# Patient Record
Sex: FEMALE | Race: BLACK OR AFRICAN AMERICAN | HISPANIC OR LATINO | Employment: PART TIME | URBAN - METROPOLITAN AREA
[De-identification: names, ages, dates, MRNs, and addresses within clinical notes are randomized per-mention and may not be internally consistent; named-entity substitution may affect disease eponyms.]

---

## 2018-03-12 ENCOUNTER — CONVERSION ENCOUNTER (OUTPATIENT)
Dept: MAMMOGRAPHY | Facility: CLINIC | Age: 43
End: 2018-03-12

## 2018-03-29 LAB
ALBUMIN SERPL BCP-MCNC: 4.4 G/DL (ref 3–5.2)
ALP SERPL-CCNC: 53 U/L (ref 43–122)
ALT SERPL W P-5'-P-CCNC: 21 U/L (ref 9–52)
ANION GAP SERPL CALCULATED.3IONS-SCNC: 9 MMOL/L (ref 5–14)
AST SERPL W P-5'-P-CCNC: 19 U/L (ref 14–36)
BILIRUB SERPL-MCNC: 0.5 MG/DL
BUN SERPL-MCNC: 10 MG/DL (ref 5–25)
CALCIUM SERPL-MCNC: 9.4 MG/DL (ref 8.4–10.2)
CHLORIDE SERPL-SCNC: 104 MEQ/L (ref 97–108)
CO2 SERPL-SCNC: 28 MMOL/L (ref 22–30)
CREATINE, SERUM (HISTORICAL): 0.57 MG/DL (ref 0.6–1.2)
EGFR (HISTORICAL): >60 ML/MIN/1.73 M2
GLUCOSE SERPL-MCNC: 71 MG/DL (ref 70–99)
POTASSIUM SERPL-SCNC: 4.7 MEQ/L (ref 3.6–5)
SODIUM SERPL-SCNC: 140 MEQ/L (ref 137–147)
T4 FREE SERPL-MCNC: 0.76 NG/DL (ref 0.78–2.19)
TOTAL PROTEIN (HISTORICAL): 7.8 G/DL (ref 5.9–8.4)
TSH SERPL DL<=0.05 MIU/L-ACNC: 9.51 UIU/ML (ref 0.47–4.68)

## 2018-10-12 ENCOUNTER — TELEPHONE (OUTPATIENT)
Dept: OBGYN CLINIC | Facility: CLINIC | Age: 43
End: 2018-10-12

## 2019-01-28 ENCOUNTER — HOSPITAL ENCOUNTER (OUTPATIENT)
Dept: RADIOLOGY | Facility: HOSPITAL | Age: 44
Discharge: HOME | End: 2019-01-28
Payer: COMMERCIAL

## 2019-01-28 ENCOUNTER — OFFICE VISIT (OUTPATIENT)
Dept: FAMILY MEDICINE CLINIC | Facility: CLINIC | Age: 44
End: 2019-01-28

## 2019-01-28 ENCOUNTER — APPOINTMENT (OUTPATIENT)
Dept: LAB | Facility: HOSPITAL | Age: 44
End: 2019-01-28
Payer: COMMERCIAL

## 2019-01-28 ENCOUNTER — TRANSCRIBE ORDERS (OUTPATIENT)
Dept: ADMINISTRATIVE | Facility: HOSPITAL | Age: 44
End: 2019-01-28

## 2019-01-28 VITALS
RESPIRATION RATE: 16 BRPM | WEIGHT: 172 LBS | SYSTOLIC BLOOD PRESSURE: 112 MMHG | TEMPERATURE: 97.6 F | DIASTOLIC BLOOD PRESSURE: 70 MMHG | HEART RATE: 85 BPM | OXYGEN SATURATION: 99 % | HEIGHT: 71 IN | BODY MASS INDEX: 24.08 KG/M2

## 2019-01-28 DIAGNOSIS — R10.9 RIGHT SIDED ABDOMINAL PAIN: ICD-10-CM

## 2019-01-28 DIAGNOSIS — D64.9 ANEMIA, UNSPECIFIED TYPE: ICD-10-CM

## 2019-01-28 DIAGNOSIS — E03.9 HYPOTHYROIDISM, UNSPECIFIED TYPE: ICD-10-CM

## 2019-01-28 DIAGNOSIS — D64.9 ANEMIA, UNSPECIFIED TYPE: Primary | ICD-10-CM

## 2019-01-28 DIAGNOSIS — E03.9 HYPOTHYROIDISM, UNSPECIFIED TYPE: Primary | ICD-10-CM

## 2019-01-28 LAB
ALBUMIN SERPL BCP-MCNC: 4.3 G/DL (ref 3–5.2)
ALP SERPL-CCNC: 56 U/L (ref 43–122)
ALT SERPL W P-5'-P-CCNC: 17 U/L (ref 9–52)
ANION GAP SERPL CALCULATED.3IONS-SCNC: 6 MMOL/L (ref 5–14)
ANISOCYTOSIS BLD QL SMEAR: PRESENT
AST SERPL W P-5'-P-CCNC: 17 U/L (ref 14–36)
BASOPHILS # BLD AUTO: 0.03 THOUSAND/UL (ref 0–0.1)
BASOPHILS NFR MAR MANUAL: 1 % (ref 0–1)
BILIRUB SERPL-MCNC: 0.6 MG/DL
BUN SERPL-MCNC: 10 MG/DL (ref 5–25)
CALCIUM SERPL-MCNC: 9.5 MG/DL (ref 8.4–10.2)
CHLORIDE SERPL-SCNC: 105 MMOL/L (ref 97–108)
CO2 SERPL-SCNC: 28 MMOL/L (ref 22–30)
CREAT SERPL-MCNC: 0.64 MG/DL (ref 0.6–1.2)
EOSINOPHIL # BLD AUTO: 0.17 THOUSAND/UL (ref 0–0.4)
EOSINOPHIL NFR BLD MANUAL: 5 % (ref 0–6)
ERYTHROCYTE [DISTWIDTH] IN BLOOD BY AUTOMATED COUNT: 18.9 %
FERRITIN SERPL-MCNC: 3 NG/ML (ref 8–388)
GFR SERPL CREATININE-BSD FRML MDRD: 108 ML/MIN/1.73SQ M
GLUCOSE P FAST SERPL-MCNC: 84 MG/DL (ref 70–99)
HCT VFR BLD AUTO: 30 % (ref 36–46)
HGB BLD-MCNC: 8.9 G/DL (ref 12–16)
HYPERCHROMIA BLD QL SMEAR: PRESENT
IRON SATN MFR SERPL: 5 %
IRON SERPL-MCNC: 18 UG/DL (ref 50–170)
LYMPHOCYTES # BLD AUTO: 1.16 THOUSAND/UL (ref 0.5–4)
LYMPHOCYTES # BLD AUTO: 35 % (ref 25–45)
MCH RBC QN AUTO: 19.2 PG (ref 26–34)
MCHC RBC AUTO-ENTMCNC: 29.6 G/DL (ref 31–36)
MCV RBC AUTO: 65 FL (ref 80–100)
MONOCYTES # BLD AUTO: 0.23 THOUSAND/UL (ref 0.2–0.9)
MONOCYTES NFR BLD AUTO: 7 % (ref 1–10)
NEUTS SEG # BLD: 1.72 THOUSAND/UL (ref 1.8–7.8)
NEUTS SEG NFR BLD AUTO: 52 %
PLATELET # BLD AUTO: 326 THOUSANDS/UL (ref 150–450)
PLATELET BLD QL SMEAR: ADEQUATE
PMV BLD AUTO: 9 FL (ref 8.9–12.7)
POIKILOCYTOSIS BLD QL SMEAR: PRESENT
POTASSIUM SERPL-SCNC: 4.7 MMOL/L (ref 3.6–5)
PROT SERPL-MCNC: 7.9 G/DL (ref 5.9–8.4)
RBC # BLD AUTO: 4.62 MILLION/UL (ref 4–5.2)
RBC MORPH BLD: ABNORMAL
SODIUM SERPL-SCNC: 139 MMOL/L (ref 137–147)
T4 FREE SERPL-MCNC: 0.83 NG/DL (ref 0.76–1.46)
TARGETS BLD QL SMEAR: PRESENT
TIBC SERPL-MCNC: 368 UG/DL (ref 250–450)
TOTAL CELLS COUNTED SPEC: 100
TSH SERPL DL<=0.05 MIU/L-ACNC: 6.66 UIU/ML (ref 0.47–4.68)
WBC # BLD AUTO: 3.3 THOUSAND/UL (ref 4.5–11)

## 2019-01-28 PROCEDURE — 99213 OFFICE O/P EST LOW 20 MIN: CPT | Performed by: FAMILY MEDICINE

## 2019-01-28 PROCEDURE — 83550 IRON BINDING TEST: CPT

## 2019-01-28 PROCEDURE — 71101 X-RAY EXAM UNILAT RIBS/CHEST: CPT

## 2019-01-28 PROCEDURE — 83540 ASSAY OF IRON: CPT

## 2019-01-28 PROCEDURE — 80053 COMPREHEN METABOLIC PANEL: CPT

## 2019-01-28 PROCEDURE — 36415 COLL VENOUS BLD VENIPUNCTURE: CPT

## 2019-01-28 PROCEDURE — 84443 ASSAY THYROID STIM HORMONE: CPT

## 2019-01-28 PROCEDURE — 82728 ASSAY OF FERRITIN: CPT

## 2019-01-28 PROCEDURE — 85007 BL SMEAR W/DIFF WBC COUNT: CPT

## 2019-01-28 PROCEDURE — 84439 ASSAY OF FREE THYROXINE: CPT

## 2019-01-28 PROCEDURE — 85027 COMPLETE CBC AUTOMATED: CPT

## 2019-01-28 RX ORDER — LEVOTHYROXINE SODIUM 0.03 MG/1
TABLET ORAL
COMMUNITY
Start: 2018-11-20 | End: 2019-05-03

## 2019-01-28 NOTE — ASSESSMENT & PLAN NOTE
Had an ultrasound of the liver previously which ruled out gallbladder stones  She has point tenderness over lower ribs on the right side    Will obtain x-ray of ribs to rule out any fracture or slipped rib syndrome  Will obtain CMP and CBC as well

## 2019-01-28 NOTE — PROGRESS NOTES
Assessment/Plan:    Hypothyroidism  She has been of Synthroid for about a month  Recheck TSH and refill based on results    Right sided abdominal pain  Had an ultrasound of the liver previously which ruled out gallbladder stones  She has point tenderness over lower ribs on the right side  Will obtain x-ray of ribs to rule out any fracture or slipped rib syndrome  Will obtain CMP and CBC as well       Diagnoses and all orders for this visit:    Hypothyroidism, unspecified type  -     TSH, 3rd generation with Free T4 reflex; Future    Right sided abdominal pain  -     CBC and differential; Future  -     Comprehensive metabolic panel; Future  -     XR ribs right w pa chest min 3 views; Future    Other orders  -     levothyroxine 25 mcg tablet;           Subjective:      Patient ID: Oneal Fernandez is a 37 y o  female  HPI  Patient is here for follow-up  She has not been seen in our office since March 2018  Still reporting right upper quadrant pain and related to food  Happened about 4 times a week  She rated as 7/10  No associated nausea or vomiting or fever  Did not take any medications for it  Patient had this pain for over a year      The following portions of the patient's history were reviewed and updated as appropriate: allergies, current medications, past family history, past medical history, past social history, past surgical history and problem list     Review of Systems   Constitutional: Negative for chills, fatigue and fever  HENT: Negative for sinus pressure and sore throat  Eyes: Negative for photophobia, pain and visual disturbance  Respiratory: Negative for cough, chest tightness and shortness of breath  Cardiovascular: Negative for chest pain and leg swelling  Gastrointestinal: Positive for abdominal pain  Negative for diarrhea  Genitourinary: Negative for difficulty urinating, dysuria, frequency, hematuria and urgency     Musculoskeletal: Negative for arthralgias and myalgias  Skin: Negative for rash  Neurological: Negative for dizziness, seizures, syncope and headaches  Hematological: Negative for adenopathy  Objective:      /70 (BP Location: Right arm, Patient Position: Sitting, Cuff Size: Standard)   Pulse 85   Temp 97 6 °F (36 4 °C) (Temporal)   Resp 16   Ht 5' 11" (1 803 m)   Wt 78 kg (172 lb)   SpO2 99%   BMI 23 99 kg/m²          Physical Exam   Constitutional: She is oriented to person, place, and time  She appears well-developed and well-nourished  HENT:   Head: Normocephalic and atraumatic  Mouth/Throat: No oropharyngeal exudate  Eyes: Pupils are equal, round, and reactive to light  Conjunctivae and EOM are normal  Right eye exhibits no discharge  Left eye exhibits no discharge  Neck: Normal range of motion  Neck supple  No JVD present  No thyromegaly present  Cardiovascular: Normal rate, regular rhythm and normal heart sounds  No murmur heard  Pulmonary/Chest: Effort normal and breath sounds normal  No respiratory distress  She has no wheezes  She has no rales  Abdominal: Bowel sounds are normal  She exhibits no distension  There is no tenderness  Right-sided tenderness with palpation over lower ribs   Musculoskeletal: Normal range of motion  She exhibits no edema or deformity  Neurological: She is alert and oriented to person, place, and time  Skin: Skin is warm and dry  Vitals reviewed

## 2019-01-29 ENCOUNTER — TELEPHONE (OUTPATIENT)
Dept: FAMILY MEDICINE CLINIC | Facility: CLINIC | Age: 44
End: 2019-01-29

## 2019-01-29 NOTE — TELEPHONE ENCOUNTER
This patient had blood work done yesterday that I need to discuss with her in person  She is [place] speaking and it's a lot for staff to explain over the phone and I will need more work up  Please call her and schedule her with me this Thursday at 1 pm or 1:40 It's OK with me to double book  Thanks

## 2019-01-30 NOTE — TELEPHONE ENCOUNTER
That's fine, let me know what time she confirms and I will schedule   I won't schedule without confirming then have her no show

## 2019-01-31 ENCOUNTER — TELEPHONE (OUTPATIENT)
Dept: FAMILY MEDICINE CLINIC | Facility: CLINIC | Age: 44
End: 2019-01-31

## 2019-01-31 DIAGNOSIS — E03.9 HYPOTHYROIDISM, UNSPECIFIED TYPE: Primary | ICD-10-CM

## 2019-01-31 RX ORDER — LEVOTHYROXINE SODIUM 0.03 MG/1
25 TABLET ORAL DAILY
Qty: 30 TABLET | Refills: 2 | Status: SHIPPED | OUTPATIENT
Start: 2019-01-31 | End: 2019-05-03

## 2019-01-31 NOTE — TELEPHONE ENCOUNTER
I tried calling patient again no answer left message  I then try calling the emergency contact wrong number

## 2019-02-11 ENCOUNTER — TELEPHONE (OUTPATIENT)
Dept: PEDIATRICS CLINIC | Facility: CLINIC | Age: 44
End: 2019-02-11

## 2019-02-11 NOTE — TELEPHONE ENCOUNTER
Please call pt and ask her if she can come in today  She needs to be seen  If she can not today you can put her with me Wednesday or Thursday   I am in the office

## 2019-05-02 ENCOUNTER — OFFICE VISIT (OUTPATIENT)
Dept: FAMILY MEDICINE CLINIC | Facility: CLINIC | Age: 44
End: 2019-05-02

## 2019-05-02 ENCOUNTER — APPOINTMENT (OUTPATIENT)
Dept: LAB | Facility: CLINIC | Age: 44
End: 2019-05-02
Payer: COMMERCIAL

## 2019-05-02 VITALS
DIASTOLIC BLOOD PRESSURE: 64 MMHG | OXYGEN SATURATION: 99 % | TEMPERATURE: 98 F | SYSTOLIC BLOOD PRESSURE: 100 MMHG | RESPIRATION RATE: 16 BRPM | HEIGHT: 71 IN | BODY MASS INDEX: 24.36 KG/M2 | HEART RATE: 85 BPM | WEIGHT: 174 LBS

## 2019-05-02 DIAGNOSIS — N92.0 MENORRHAGIA WITH REGULAR CYCLE: ICD-10-CM

## 2019-05-02 DIAGNOSIS — D64.9 ANEMIA, UNSPECIFIED TYPE: ICD-10-CM

## 2019-05-02 DIAGNOSIS — D64.9 ANEMIA, UNSPECIFIED TYPE: Primary | ICD-10-CM

## 2019-05-02 LAB
ALBUMIN SERPL BCP-MCNC: 3.9 G/DL (ref 3.5–5)
ALP SERPL-CCNC: 53 U/L (ref 46–116)
ALT SERPL W P-5'-P-CCNC: 14 U/L (ref 12–78)
ANION GAP SERPL CALCULATED.3IONS-SCNC: 4 MMOL/L (ref 4–13)
AST SERPL W P-5'-P-CCNC: 6 U/L (ref 5–45)
BASOPHILS # BLD AUTO: 0.04 THOUSANDS/ΜL (ref 0–0.1)
BASOPHILS NFR BLD AUTO: 1 % (ref 0–1)
BILIRUB SERPL-MCNC: 0.53 MG/DL (ref 0.2–1)
BUN SERPL-MCNC: 7 MG/DL (ref 5–25)
CALCIUM SERPL-MCNC: 8.6 MG/DL (ref 8.3–10.1)
CHLORIDE SERPL-SCNC: 108 MMOL/L (ref 100–108)
CO2 SERPL-SCNC: 25 MMOL/L (ref 21–32)
CREAT SERPL-MCNC: 0.75 MG/DL (ref 0.6–1.3)
EOSINOPHIL # BLD AUTO: 0.06 THOUSAND/ΜL (ref 0–0.61)
EOSINOPHIL NFR BLD AUTO: 2 % (ref 0–6)
ERYTHROCYTE [DISTWIDTH] IN BLOOD BY AUTOMATED COUNT: 18.8 % (ref 11.6–15.1)
FERRITIN SERPL-MCNC: 3 NG/ML (ref 8–388)
GFR SERPL CREATININE-BSD FRML MDRD: 97 ML/MIN/1.73SQ M
GLUCOSE P FAST SERPL-MCNC: 75 MG/DL (ref 65–99)
HCT VFR BLD AUTO: 27.9 % (ref 34.8–46.1)
HGB BLD-MCNC: 7.7 G/DL (ref 11.5–15.4)
IMM GRANULOCYTES # BLD AUTO: 0.01 THOUSAND/UL (ref 0–0.2)
IMM GRANULOCYTES NFR BLD AUTO: 0 % (ref 0–2)
IRON SATN MFR SERPL: 6 %
IRON SERPL-MCNC: 23 UG/DL (ref 50–170)
LYMPHOCYTES # BLD AUTO: 1.4 THOUSANDS/ΜL (ref 0.6–4.47)
LYMPHOCYTES NFR BLD AUTO: 41 % (ref 14–44)
MCH RBC QN AUTO: 18.8 PG (ref 26.8–34.3)
MCHC RBC AUTO-ENTMCNC: 27.6 G/DL (ref 31.4–37.4)
MCV RBC AUTO: 68 FL (ref 82–98)
MONOCYTES # BLD AUTO: 0.51 THOUSAND/ΜL (ref 0.17–1.22)
MONOCYTES NFR BLD AUTO: 15 % (ref 4–12)
NEUTROPHILS # BLD AUTO: 1.38 THOUSANDS/ΜL (ref 1.85–7.62)
NEUTS SEG NFR BLD AUTO: 41 % (ref 43–75)
NRBC BLD AUTO-RTO: 0 /100 WBCS
PLATELET # BLD AUTO: 261 THOUSANDS/UL (ref 149–390)
PMV BLD AUTO: 12 FL (ref 8.9–12.7)
POTASSIUM SERPL-SCNC: 4 MMOL/L (ref 3.5–5.3)
PROT SERPL-MCNC: 7.8 G/DL (ref 6.4–8.2)
RBC # BLD AUTO: 4.09 MILLION/UL (ref 3.81–5.12)
SODIUM SERPL-SCNC: 137 MMOL/L (ref 136–145)
T4 FREE SERPL-MCNC: 0.72 NG/DL (ref 0.76–1.46)
TIBC SERPL-MCNC: 378 UG/DL (ref 250–450)
TSH SERPL DL<=0.05 MIU/L-ACNC: 15 UIU/ML (ref 0.36–3.74)
VIT B12 SERPL-MCNC: 1141 PG/ML (ref 100–900)
WBC # BLD AUTO: 3.4 THOUSAND/UL (ref 4.31–10.16)

## 2019-05-02 PROCEDURE — 83550 IRON BINDING TEST: CPT

## 2019-05-02 PROCEDURE — 83540 ASSAY OF IRON: CPT

## 2019-05-02 PROCEDURE — 36415 COLL VENOUS BLD VENIPUNCTURE: CPT

## 2019-05-02 PROCEDURE — 82728 ASSAY OF FERRITIN: CPT

## 2019-05-02 PROCEDURE — 84439 ASSAY OF FREE THYROXINE: CPT

## 2019-05-02 PROCEDURE — 85025 COMPLETE CBC W/AUTO DIFF WBC: CPT

## 2019-05-02 PROCEDURE — 82607 VITAMIN B-12: CPT

## 2019-05-02 PROCEDURE — 84443 ASSAY THYROID STIM HORMONE: CPT

## 2019-05-02 PROCEDURE — 80053 COMPREHEN METABOLIC PANEL: CPT

## 2019-05-02 PROCEDURE — 99213 OFFICE O/P EST LOW 20 MIN: CPT | Performed by: FAMILY MEDICINE

## 2019-05-02 PROCEDURE — 83020 HEMOGLOBIN ELECTROPHORESIS: CPT

## 2019-05-02 RX ORDER — ASCORBIC ACID 500 MG
500 TABLET ORAL DAILY
Qty: 30 TABLET | Refills: 2 | Status: SHIPPED | OUTPATIENT
Start: 2019-05-02

## 2019-05-02 RX ORDER — FERROUS SULFATE TAB EC 324 MG (65 MG FE EQUIVALENT) 324 (65 FE) MG
324 TABLET DELAYED RESPONSE ORAL
Qty: 30 TABLET | Refills: 1 | Status: SHIPPED | OUTPATIENT
Start: 2019-05-02 | End: 2020-11-25 | Stop reason: ALTCHOICE

## 2019-05-03 ENCOUNTER — TELEPHONE (OUTPATIENT)
Dept: FAMILY MEDICINE CLINIC | Facility: CLINIC | Age: 44
End: 2019-05-03

## 2019-05-03 DIAGNOSIS — D64.9 ANEMIA, UNSPECIFIED TYPE: ICD-10-CM

## 2019-05-03 DIAGNOSIS — E03.9 HYPOTHYROIDISM, UNSPECIFIED TYPE: Primary | ICD-10-CM

## 2019-05-03 RX ORDER — LEVOTHYROXINE SODIUM 0.05 MG/1
50 TABLET ORAL
Qty: 30 TABLET | Refills: 5 | Status: SHIPPED | OUTPATIENT
Start: 2019-05-03 | End: 2019-07-09 | Stop reason: DRUGHIGH

## 2019-05-06 LAB
DEPRECATED HGB OTHER BLD-IMP: 0 %
HGB A MFR BLD: 4.2 % (ref 1.8–3.2)
HGB A MFR BLD: 95.1 % (ref 96.4–98.8)
HGB C MFR BLD: 0 %
HGB F MFR BLD: 0.7 % (ref 0–2)
HGB FRACT BLD-IMP: ABNORMAL
HGB S BLD QL SOLY: NEGATIVE
HGB S MFR BLD: 0 %

## 2019-07-08 ENCOUNTER — APPOINTMENT (OUTPATIENT)
Dept: LAB | Facility: CLINIC | Age: 44
End: 2019-07-08
Payer: COMMERCIAL

## 2019-07-08 ENCOUNTER — ANNUAL EXAM (OUTPATIENT)
Dept: OBGYN CLINIC | Facility: CLINIC | Age: 44
End: 2019-07-08

## 2019-07-08 ENCOUNTER — TRANSCRIBE ORDERS (OUTPATIENT)
Dept: LAB | Facility: CLINIC | Age: 44
End: 2019-07-08

## 2019-07-08 VITALS
BODY MASS INDEX: 24.72 KG/M2 | HEIGHT: 71 IN | DIASTOLIC BLOOD PRESSURE: 70 MMHG | WEIGHT: 176.6 LBS | SYSTOLIC BLOOD PRESSURE: 100 MMHG

## 2019-07-08 DIAGNOSIS — R10.2 PELVIC PAIN: ICD-10-CM

## 2019-07-08 DIAGNOSIS — Z01.411 ENCOUNTER FOR GYNECOLOGICAL EXAMINATION WITH ABNORMAL FINDING: Primary | ICD-10-CM

## 2019-07-08 DIAGNOSIS — Z12.4 SCREENING FOR CERVICAL CANCER: ICD-10-CM

## 2019-07-08 DIAGNOSIS — Z12.39 SCREENING FOR BREAST CANCER: ICD-10-CM

## 2019-07-08 DIAGNOSIS — E03.9 HYPOTHYROIDISM, UNSPECIFIED TYPE: ICD-10-CM

## 2019-07-08 DIAGNOSIS — D64.9 ANEMIA, UNSPECIFIED TYPE: ICD-10-CM

## 2019-07-08 DIAGNOSIS — N92.1 MENORRHAGIA WITH IRREGULAR CYCLE: ICD-10-CM

## 2019-07-08 LAB
BASOPHILS # BLD AUTO: 0.03 THOUSANDS/ΜL (ref 0–0.1)
BASOPHILS NFR BLD AUTO: 1 % (ref 0–1)
EOSINOPHIL # BLD AUTO: 0.07 THOUSAND/ΜL (ref 0–0.61)
EOSINOPHIL NFR BLD AUTO: 2 % (ref 0–6)
ERYTHROCYTE [DISTWIDTH] IN BLOOD BY AUTOMATED COUNT: 18.3 % (ref 11.6–15.1)
HCT VFR BLD AUTO: 30.6 % (ref 34.8–46.1)
HGB BLD-MCNC: 8.6 G/DL (ref 11.5–15.4)
IMM GRANULOCYTES # BLD AUTO: 0.01 THOUSAND/UL (ref 0–0.2)
IMM GRANULOCYTES NFR BLD AUTO: 0 % (ref 0–2)
LYMPHOCYTES # BLD AUTO: 1.39 THOUSANDS/ΜL (ref 0.6–4.47)
LYMPHOCYTES NFR BLD AUTO: 44 % (ref 14–44)
MCH RBC QN AUTO: 19.8 PG (ref 26.8–34.3)
MCHC RBC AUTO-ENTMCNC: 28.1 G/DL (ref 31.4–37.4)
MCV RBC AUTO: 70 FL (ref 82–98)
MONOCYTES # BLD AUTO: 0.47 THOUSAND/ΜL (ref 0.17–1.22)
MONOCYTES NFR BLD AUTO: 15 % (ref 4–12)
NEUTROPHILS # BLD AUTO: 1.21 THOUSANDS/ΜL (ref 1.85–7.62)
NEUTS SEG NFR BLD AUTO: 38 % (ref 43–75)
NRBC BLD AUTO-RTO: 0 /100 WBCS
PLATELET # BLD AUTO: 325 THOUSANDS/UL (ref 149–390)
PMV BLD AUTO: 11.2 FL (ref 8.9–12.7)
RBC # BLD AUTO: 4.35 MILLION/UL (ref 3.81–5.12)
T4 FREE SERPL-MCNC: 0.83 NG/DL (ref 0.76–1.46)
TSH SERPL DL<=0.05 MIU/L-ACNC: 9.25 UIU/ML (ref 0.36–3.74)
WBC # BLD AUTO: 3.18 THOUSAND/UL (ref 4.31–10.16)

## 2019-07-08 PROCEDURE — 84443 ASSAY THYROID STIM HORMONE: CPT

## 2019-07-08 PROCEDURE — 3725F SCREEN DEPRESSION PERFORMED: CPT | Performed by: NURSE PRACTITIONER

## 2019-07-08 PROCEDURE — 87624 HPV HI-RISK TYP POOLED RSLT: CPT | Performed by: NURSE PRACTITIONER

## 2019-07-08 PROCEDURE — 84439 ASSAY OF FREE THYROXINE: CPT

## 2019-07-08 PROCEDURE — 99386 PREV VISIT NEW AGE 40-64: CPT | Performed by: NURSE PRACTITIONER

## 2019-07-08 PROCEDURE — G0145 SCR C/V CYTO,THINLAYER,RESCR: HCPCS | Performed by: NURSE PRACTITIONER

## 2019-07-08 PROCEDURE — 85025 COMPLETE CBC W/AUTO DIFF WBC: CPT

## 2019-07-08 PROCEDURE — 36415 COLL VENOUS BLD VENIPUNCTURE: CPT

## 2019-07-08 NOTE — LETTER
2019    To Sukhjinder Diaz  : 1975      This letter is to advise you that your recent PAP SMEAR results were reviewed by me and are NORMAL    We will see you in 1 year for your annual exam     ZEN Jaquez

## 2019-07-08 NOTE — PROGRESS NOTES
Jb Castillo is a 37 y o  female who presents today for annual GYN exam   Her last pap smear was performed 3/8/2018 and result was NILM  She reports no history of abnormal pap smears in her past  Her last mammogram was performed 3/12/18 and result was WNL  She reports menses as regular but frequency shortened to every 20 days and duration 5 days with heavy flow and c/o constant pelvic pain over past 3 months  Patient's last menstrual period was 2019 (approximate)  Her contraceptive method is nothing - she is not currently sexually active  Her general medical history has been reviewed and she reports it as follows:    Past Medical History:   Diagnosis Date    Fibroid 2017    small fibroids noted    Hypothyroid      Past Surgical History:   Procedure Laterality Date     SECTION  ,      OB History        5    Para   2    Term   2       0    AB   3    Living   2       SAB   2    TAB   1    Ectopic   0    Multiple   0    Live Births   2               Social History     Tobacco Use    Smoking status: Never Smoker    Smokeless tobacco: Never Used   Substance Use Topics    Alcohol use: Yes     Frequency: Monthly or less     Drinks per session: 1 or 2    Drug use: Never     Cancer-related family history is negative for Breast cancer and Cancer  Current Outpatient Medications:     ascorbic acid (VITAMIN C) 500 mg tablet, Take 1 tablet (500 mg total) by mouth daily, Disp: 30 tablet, Rfl: 2    ferrous sulfate 324 (65 Fe) mg, Take 1 tablet (324 mg total) by mouth 2 (two) times a day before meals, Disp: 30 tablet, Rfl: 1    levothyroxine 50 mcg tablet, Take 1 tablet (50 mcg total) by mouth daily in the early morning, Disp: 30 tablet, Rfl: 5    Review of Systems:  Review of Systems   Constitutional: Negative  Gastrointestinal: Negative  Genitourinary: Positive for menstrual problem and pelvic pain   Negative for difficulty urinating and vaginal discharge  Skin: Negative  Physical Exam:  /70   Ht 5' 11" (1 803 m)   Wt 80 1 kg (176 lb 9 6 oz)   LMP 06/26/2019 (Approximate)   BMI 24 63 kg/m²   Physical Exam   Constitutional: She is oriented to person, place, and time  She appears well-developed and well-nourished  Genitourinary: Vagina normal and uterus normal  There is no lesion on the right labia  There is no lesion on the left labia  Vagina exhibits no lesion and no rugosity  No vaginal discharge found  Right adnexum displays tenderness  Right adnexum does not display mass  Left adnexum displays tenderness  Left adnexum does not display mass  Cervix does not exhibit motion tenderness, lesion or pinkness  Uterus is not tender  Neck: Neck supple  No thyromegaly present  Cardiovascular: Normal rate and regular rhythm  Pulmonary/Chest: Effort normal and breath sounds normal  Right breast exhibits no mass, no nipple discharge, no skin change and no tenderness  Left breast exhibits no mass, no nipple discharge, no skin change and no tenderness  Abdominal: Soft  There is tenderness  Neurological: She is alert and oriented to person, place, and time  Skin: Skin is warm and dry  Assessment/Plan:   1  Abnormal well-woman GYN exam   2  Cervical cancer screening:  Pap smear done with HPV co-testing  3  STD screening:  Patient declines  4  Breast cancer screening:  Normal breast exam   Order placed for bilateral screening mammogram    5  Depression Screening: Patient's depression screening was positive with a PHQ-2 score of 0  No intervention is indicated  6  BMI Counseling: Body mass index is 24 63 kg/m²  No intervention is indicated  7  Pelvic pain:  Order placed for pelvic ultrasound  8  Abnormal Uterine Bleeding:  Order placed for pelvic ultrasound  9  Return to office in 2 weeks to review ultrasound results

## 2019-07-09 DIAGNOSIS — E03.9 HYPOTHYROIDISM, UNSPECIFIED TYPE: Primary | ICD-10-CM

## 2019-07-09 LAB
HPV HR 12 DNA CVX QL NAA+PROBE: NEGATIVE
HPV16 DNA CVX QL NAA+PROBE: NEGATIVE
HPV18 DNA CVX QL NAA+PROBE: NEGATIVE

## 2019-07-09 RX ORDER — LEVOTHYROXINE SODIUM 0.07 MG/1
75 TABLET ORAL DAILY
Qty: 30 TABLET | Refills: 1 | Status: SHIPPED | OUTPATIENT
Start: 2019-07-09 | End: 2019-12-04

## 2019-07-11 LAB
LAB AP GYN PRIMARY INTERPRETATION: NORMAL
Lab: NORMAL

## 2019-07-17 ENCOUNTER — HOSPITAL ENCOUNTER (OUTPATIENT)
Dept: ULTRASOUND IMAGING | Facility: HOSPITAL | Age: 44
Discharge: HOME | End: 2019-07-17
Attending: NURSE PRACTITIONER
Payer: COMMERCIAL

## 2019-07-17 ENCOUNTER — HOSPITAL ENCOUNTER (OUTPATIENT)
Dept: MAMMOGRAPHY | Facility: CLINIC | Age: 44
Discharge: HOME | End: 2019-07-17
Attending: NURSE PRACTITIONER
Payer: COMMERCIAL

## 2019-07-17 VITALS — WEIGHT: 176 LBS | HEIGHT: 71 IN | BODY MASS INDEX: 24.64 KG/M2

## 2019-07-17 DIAGNOSIS — Z12.39 SCREENING FOR BREAST CANCER: ICD-10-CM

## 2019-07-17 DIAGNOSIS — R10.2 PELVIC PAIN: ICD-10-CM

## 2019-07-17 PROCEDURE — 76830 TRANSVAGINAL US NON-OB: CPT

## 2019-07-17 PROCEDURE — 76856 US EXAM PELVIC COMPLETE: CPT

## 2019-07-17 PROCEDURE — 77067 SCR MAMMO BI INCL CAD: CPT

## 2019-08-30 ENCOUNTER — APPOINTMENT (EMERGENCY)
Dept: RADIOLOGY | Facility: HOSPITAL | Age: 44
End: 2019-08-30
Payer: COMMERCIAL

## 2019-08-30 ENCOUNTER — HOSPITAL ENCOUNTER (EMERGENCY)
Facility: HOSPITAL | Age: 44
Discharge: HOME | End: 2019-08-30
Attending: EMERGENCY MEDICINE | Admitting: EMERGENCY MEDICINE
Payer: COMMERCIAL

## 2019-08-30 VITALS
OXYGEN SATURATION: 100 % | RESPIRATION RATE: 16 BRPM | DIASTOLIC BLOOD PRESSURE: 73 MMHG | HEART RATE: 67 BPM | BODY MASS INDEX: 25.38 KG/M2 | TEMPERATURE: 99.6 F | SYSTOLIC BLOOD PRESSURE: 117 MMHG | WEIGHT: 182 LBS

## 2019-08-30 DIAGNOSIS — S93.602A SPRAIN OF LEFT FOOT, INITIAL ENCOUNTER: Primary | ICD-10-CM

## 2019-08-30 PROCEDURE — 73630 X-RAY EXAM OF FOOT: CPT

## 2019-08-30 PROCEDURE — 99283 EMERGENCY DEPT VISIT LOW MDM: CPT

## 2019-08-30 PROCEDURE — 99282 EMERGENCY DEPT VISIT SF MDM: CPT | Performed by: PHYSICIAN ASSISTANT

## 2019-08-30 RX ORDER — HYDROXYZINE 50 MG/1
75 TABLET, FILM COATED ORAL ONCE
COMMUNITY

## 2019-08-30 NOTE — ED PROVIDER NOTES
History  Chief Complaint   Patient presents with    Fall     fall on stairs today and hurt left foot     47-year-old female presenting for evaluation of left foot pain  Pt reports she was walking down the steps earlier this morning when her foot got twisted in her sweatpants causing her to fall  She denies hitting her head or LOC  She localizes the pain over the dorsum of the midfoot  She denies any radiation of the pain  Reports taking ibuprofen prior to arrival with only minimal relief  She denies any numbness tingling or weakness of the lower extremity  No previous injury  Prior to Admission Medications   Prescriptions Last Dose Informant Patient Reported? Taking?   ascorbic acid (VITAMIN C) 500 mg tablet   No No   Sig: Take 1 tablet (500 mg total) by mouth daily   ferrous sulfate 324 (65 Fe) mg   No No   Sig: Take 1 tablet (324 mg total) by mouth 2 (two) times a day before meals   hydrOXYzine HCL (ATARAX) 50 mg tablet   Yes Yes   Sig: Take 75 mg by mouth once   levothyroxine 75 mcg tablet   No No   Sig: Take 1 tablet (75 mcg total) by mouth daily      Facility-Administered Medications: None       Past Medical History:   Diagnosis Date    Fibroid 2017    small fibroids noted    Hypothyroid        Past Surgical History:   Procedure Laterality Date     SECTION  ,        Family History   Problem Relation Age of Onset    Breast cancer Neg Hx     Cancer Neg Hx      I have reviewed and agree with the history as documented  Social History     Tobacco Use    Smoking status: Never Smoker    Smokeless tobacco: Never Used   Substance Use Topics    Alcohol use: Yes     Frequency: Monthly or less     Drinks per session: 1 or 2    Drug use: Never        Review of Systems   All other systems reviewed and are negative  Physical Exam  Physical Exam   Constitutional: She is oriented to person, place, and time  She appears well-developed and well-nourished  No distress     HENT:   Head: Normocephalic and atraumatic  Eyes: Conjunctivae are normal    EOM grossly intact   Neck: Normal range of motion  Neck supple  No JVD present  Cardiovascular: Normal rate  Pulmonary/Chest: Effort normal    Abdominal: Soft  Musculoskeletal:        Feet:    FROM, steady gait, cap refill brisk, strength and sensation grossly intact throughout   Neurological: She is alert and oriented to person, place, and time  Skin: Skin is warm and dry  Capillary refill takes less than 2 seconds  Psychiatric: She has a normal mood and affect  Her behavior is normal    Nursing note and vitals reviewed  Vital Signs  ED Triage Vitals [08/30/19 1429]   Temperature Pulse Respirations Blood Pressure SpO2   99 6 °F (37 6 °C) 67 16 117/73 100 %      Temp Source Heart Rate Source Patient Position - Orthostatic VS BP Location FiO2 (%)   Tympanic Monitor Sitting Left arm --      Pain Score       Worst Possible Pain           Vitals:    08/30/19 1429   BP: 117/73   Pulse: 67   Patient Position - Orthostatic VS: Sitting         Visual Acuity  Visual Acuity      Most Recent Value   L Pupil Size (mm)  3   R Pupil Size (mm)  3          ED Medications  Medications - No data to display    Diagnostic Studies  Results Reviewed     None                 XR foot 3+ views LEFT   Final Result by Cyrus Sims MD (08/30 1519)      No acute osseous abnormality              Workstation performed: [id number]                    Procedures  Procedures       ED Course                               MDM  Number of Diagnoses or Management Options  Sprain of left foot, initial encounter:   Diagnosis management comments: 40-year-old female presenting for evaluation of left foot pain after trip and fall earlier this morning, no acute osseous abnormality visualized on x-ray, likely sprain, will place patient in a postop shoe and provided crutches for symptomatic relief, advised to restrict activity ice elevate ice Motrin and Tylenol, f/u with podiatry and pcp as an outpatient    All imaging discussed with patient, strict return to ED precautions discussed  Pt verbalizes understanding and agrees with plan  Pt is stable for discharge    Portions of the record may have been created with voice recognition software  Occasional wrong word or "sound a like" substitutions may have occurred due to the inherent limitations of voice recognition software  Read the chart carefully and recognize, using context, where substitutions have occurred  Disposition  Final diagnoses:   Sprain of left foot, initial encounter     Time reflects when diagnosis was documented in both MDM as applicable and the Disposition within this note     Time User Action Codes Description Comment    8/30/2019  3:24 PM Scott, [address]  Sprain of left foot, initial encounter       ED Disposition     ED Disposition Condition Date/Time Comment    Discharge Stable Fri Aug 30, 2019  3:24  [facility] [address] discharge to home/self care              Follow-up Information     Follow up With Specialties Details Why Contact Info Additional [address] Family Medicine Call in 1 day  [address], [address] [facility] [phone]  [address], [address], [address] [place], [place], [address] [facility], [address] [facility], [address] [facility]  Call in 1 day  Ricarda Soriano 25  [address] [facility]  [facility] [address]             Discharge Medication List as of 8/30/2019  3:24 PM      CONTINUE these medications which have NOT CHANGED    Details   hydrOXYzine HCL (ATARAX) 50 mg tablet Take 75 mg by mouth once, Historical Med      ascorbic acid (VITAMIN C) 500 mg tablet Take 1 tablet (500 mg total) by mouth daily, Starting u 5/2/2019, Normal      ferrous sulfate 324 (65 Fe) mg Take 1 tablet (324 mg total) by mouth 2 (two) times a day before meals, Starting Thu 5/2/2019, Normal      levothyroxine 75 mcg tablet Take 1 tablet (75 mcg total) by mouth daily, Starting Tue 7/9/2019, Normal           No discharge procedures on file      ED Provider  Electronically Signed by           Dana aVlerio PA-C  08/30/19 1085

## 2019-11-25 ENCOUNTER — APPOINTMENT (OUTPATIENT)
Dept: LAB | Facility: CLINIC | Age: 44
End: 2019-11-25
Payer: COMMERCIAL

## 2019-11-25 ENCOUNTER — OFFICE VISIT (OUTPATIENT)
Dept: FAMILY MEDICINE CLINIC | Facility: CLINIC | Age: 44
End: 2019-11-25

## 2019-11-25 VITALS
OXYGEN SATURATION: 98 % | TEMPERATURE: 98 F | WEIGHT: 170 LBS | HEIGHT: 71 IN | HEART RATE: 61 BPM | RESPIRATION RATE: 16 BRPM | BODY MASS INDEX: 23.8 KG/M2 | DIASTOLIC BLOOD PRESSURE: 76 MMHG | SYSTOLIC BLOOD PRESSURE: 110 MMHG

## 2019-11-25 DIAGNOSIS — Z11.4 ENCOUNTER FOR SCREENING FOR HIV: ICD-10-CM

## 2019-11-25 DIAGNOSIS — E03.9 HYPOTHYROIDISM, UNSPECIFIED TYPE: ICD-10-CM

## 2019-11-25 DIAGNOSIS — D64.9 ANEMIA, UNSPECIFIED TYPE: ICD-10-CM

## 2019-11-25 DIAGNOSIS — E03.8 OTHER SPECIFIED HYPOTHYROIDISM: ICD-10-CM

## 2019-11-25 DIAGNOSIS — N92.0 MENORRHAGIA WITH REGULAR CYCLE: ICD-10-CM

## 2019-11-25 DIAGNOSIS — E03.8 OTHER SPECIFIED HYPOTHYROIDISM: Primary | ICD-10-CM

## 2019-11-25 LAB
BASOPHILS # BLD AUTO: 0.03 THOUSANDS/ΜL (ref 0–0.1)
BASOPHILS NFR BLD AUTO: 1 % (ref 0–1)
EOSINOPHIL # BLD AUTO: 0.08 THOUSAND/ΜL (ref 0–0.61)
EOSINOPHIL NFR BLD AUTO: 3 % (ref 0–6)
ERYTHROCYTE [DISTWIDTH] IN BLOOD BY AUTOMATED COUNT: 18.6 % (ref 11.6–15.1)
HCT VFR BLD AUTO: 30.8 % (ref 34.8–46.1)
HGB BLD-MCNC: 8.6 G/DL (ref 11.5–15.4)
IMM GRANULOCYTES # BLD AUTO: 0 THOUSAND/UL (ref 0–0.2)
IMM GRANULOCYTES NFR BLD AUTO: 0 % (ref 0–2)
LYMPHOCYTES # BLD AUTO: 1.34 THOUSANDS/ΜL (ref 0.6–4.47)
LYMPHOCYTES NFR BLD AUTO: 51 % (ref 14–44)
MCH RBC QN AUTO: 19.1 PG (ref 26.8–34.3)
MCHC RBC AUTO-ENTMCNC: 27.9 G/DL (ref 31.4–37.4)
MCV RBC AUTO: 68 FL (ref 82–98)
MONOCYTES # BLD AUTO: 0.36 THOUSAND/ΜL (ref 0.17–1.22)
MONOCYTES NFR BLD AUTO: 14 % (ref 4–12)
NEUTROPHILS # BLD AUTO: 0.8 THOUSANDS/ΜL (ref 1.85–7.62)
NEUTS SEG NFR BLD AUTO: 31 % (ref 43–75)
NRBC BLD AUTO-RTO: 0 /100 WBCS
PLATELET # BLD AUTO: 348 THOUSANDS/UL (ref 149–390)
PMV BLD AUTO: 10.8 FL (ref 8.9–12.7)
RBC # BLD AUTO: 4.51 MILLION/UL (ref 3.81–5.12)
T4 FREE SERPL-MCNC: 0.69 NG/DL (ref 0.76–1.46)
TSH SERPL DL<=0.05 MIU/L-ACNC: 11.1 UIU/ML (ref 0.36–3.74)
WBC # BLD AUTO: 2.61 THOUSAND/UL (ref 4.31–10.16)

## 2019-11-25 PROCEDURE — 87389 HIV-1 AG W/HIV-1&-2 AB AG IA: CPT

## 2019-11-25 PROCEDURE — 3008F BODY MASS INDEX DOCD: CPT | Performed by: FAMILY MEDICINE

## 2019-11-25 PROCEDURE — 1036F TOBACCO NON-USER: CPT | Performed by: FAMILY MEDICINE

## 2019-11-25 PROCEDURE — 99213 OFFICE O/P EST LOW 20 MIN: CPT | Performed by: FAMILY MEDICINE

## 2019-11-25 PROCEDURE — 85025 COMPLETE CBC W/AUTO DIFF WBC: CPT

## 2019-11-25 PROCEDURE — 36415 COLL VENOUS BLD VENIPUNCTURE: CPT

## 2019-11-25 PROCEDURE — 90686 IIV4 VACC NO PRSV 0.5 ML IM: CPT | Performed by: FAMILY MEDICINE

## 2019-11-25 PROCEDURE — 84439 ASSAY OF FREE THYROXINE: CPT

## 2019-11-25 PROCEDURE — 84443 ASSAY THYROID STIM HORMONE: CPT

## 2019-11-25 PROCEDURE — 90471 IMMUNIZATION ADMIN: CPT | Performed by: FAMILY MEDICINE

## 2019-11-25 NOTE — PROGRESS NOTES
Assessment/Plan:    Anemia  CBC ordered  Appears fatigued and pallor noted on exam  Encouraged to have blood drawn today, discussed the possibility of needing a transfusion depending on hemoglobin, verbalized understanding  Counseled the importance of medication compliance, reviewed iron rich foods with patient    Hypothyroidism  Currently on Synthroid, TSH level checked       Diagnoses and all orders for this visit:    Other specified hypothyroidism  -     TSH, 3rd generation with Free T4 reflex; Future    Anemia, unspecified type  -     CBC and differential; Future  -     Ambulatory referral to Gynecology; Future    Encounter for screening for HIV  -     HIV 1/2 AG-AB combo; Future  -     FLUZONE: influenza vaccine, quadrivalent, 0 5 mL    Menorrhagia with regular cycle  -     Ambulatory referral to Gynecology; Future          Subjective:      Patient ID: Dorothea Fong is a 53 y o  female  HPI    53-year-old female presents to clinic for routine follow-up, she has a history of anemia, menorrhagia and hypothyroidism  States she has not been taking her iron pills as directed states she has been making dietary modifications and is feeling better overall  Ultrasound and mammogram findings were reviewed with patient, she is requesting follow-up with OB for possible intervention regarding her fibroids  Has no other complaints at this timeThe following portions of the patient's history were reviewed and updated as appropriate: allergies, current medications, past family history, past medical history, past social history, past surgical history and problem list         Review of Systems   Constitutional: Positive for fatigue  Negative for activity change, appetite change, fever and unexpected weight change  HENT: Negative for sneezing, sore throat and tinnitus  Eyes: Negative for pain  Respiratory: Negative for cough, choking and chest tightness      Cardiovascular: Negative for chest pain, palpitations and leg swelling  Gastrointestinal: Negative for abdominal distention, abdominal pain, constipation, diarrhea, nausea and vomiting  Endocrine: Negative for polydipsia, polyphagia and polyuria  Genitourinary: Negative for dysuria and flank pain  Musculoskeletal: Positive for back pain  Neurological: Negative for dizziness, seizures, syncope, speech difficulty, light-headedness and numbness  Psychiatric/Behavioral: Negative for agitation  Objective:      /76 (BP Location: Right arm, Patient Position: Sitting, Cuff Size: Standard)   Pulse 61   Temp 98 °F (36 7 °C) (Temporal)   Resp 16   Ht 5' 11" (1 803 m)   Wt 77 1 kg (170 lb)   SpO2 98%   BMI 23 71 kg/m²          Physical Exam   Constitutional: She is oriented to person, place, and time  She appears well-developed  No distress  Conjunctival pallor   HENT:   Head: Normocephalic and atraumatic  Eyes: Pupils are equal, round, and reactive to light  Neck: Neck supple  No tracheal deviation present  No thyromegaly present  Cardiovascular: Normal rate, regular rhythm and normal heart sounds  No murmur heard  Pulmonary/Chest: Effort normal and breath sounds normal  No respiratory distress  She has no wheezes  Abdominal: Soft  Bowel sounds are normal  She exhibits no distension  There is no tenderness  There is no guarding  Musculoskeletal: Normal range of motion  She exhibits no edema  Neurological: She is alert and oriented to person, place, and time  Skin: Skin is warm  No rash noted  She is not diaphoretic  There is pallor  Psychiatric: She has a normal mood and affect

## 2019-11-25 NOTE — ASSESSMENT & PLAN NOTE
CBC ordered  Appears fatigued and pallor noted on exam  Encouraged to have blood drawn today, discussed the possibility of needing a transfusion depending on hemoglobin, verbalized understanding    Counseled the importance of medication compliance, reviewed iron rich foods with patient

## 2019-11-27 ENCOUNTER — TELEPHONE (OUTPATIENT)
Dept: FAMILY MEDICINE CLINIC | Facility: CLINIC | Age: 44
End: 2019-11-27

## 2019-11-27 LAB — HIV 1+2 AB+HIV1 P24 AG SERPL QL IA: NORMAL

## 2019-11-27 NOTE — TELEPHONE ENCOUNTER
Pt already scheduled OB/Gyn [facility]([phone]) for 7/8/2020 at [address], [address], [place]

## 2019-12-04 ENCOUNTER — TELEPHONE (OUTPATIENT)
Dept: FAMILY MEDICINE CLINIC | Facility: CLINIC | Age: 44
End: 2019-12-04

## 2019-12-04 DIAGNOSIS — E03.9 HYPOTHYROIDISM, UNSPECIFIED TYPE: Primary | ICD-10-CM

## 2019-12-04 RX ORDER — LEVOTHYROXINE SODIUM 0.1 MG/1
100 TABLET ORAL DAILY
Qty: 90 TABLET | Refills: 1 | Status: SHIPPED | OUTPATIENT
Start: 2019-12-04 | End: 2020-11-06

## 2019-12-04 NOTE — TELEPHONE ENCOUNTER
Pt leave a msg stating that she was here for Physical 11/25/19 and the Doctor told her that they are going to send her to see and specialist for the knee pain and joint  Also pt is req a RF on the Levothyroxine  Please advise   Thx

## 2020-01-03 ENCOUNTER — OFFICE VISIT (OUTPATIENT)
Dept: FAMILY MEDICINE CLINIC | Facility: CLINIC | Age: 45
End: 2020-01-03

## 2020-01-03 VITALS
BODY MASS INDEX: 23.43 KG/M2 | SYSTOLIC BLOOD PRESSURE: 110 MMHG | OXYGEN SATURATION: 98 % | TEMPERATURE: 97.8 F | RESPIRATION RATE: 18 BRPM | HEART RATE: 80 BPM | DIASTOLIC BLOOD PRESSURE: 76 MMHG | WEIGHT: 168 LBS

## 2020-01-03 DIAGNOSIS — M25.561 ACUTE PAIN OF RIGHT KNEE: Primary | ICD-10-CM

## 2020-01-03 DIAGNOSIS — M54.50 LOW BACK PAIN, UNSPECIFIED BACK PAIN LATERALITY, UNSPECIFIED CHRONICITY, UNSPECIFIED WHETHER SCIATICA PRESENT: ICD-10-CM

## 2020-01-03 LAB — SL AMB POCT URINE HCG: NEGATIVE

## 2020-01-03 PROCEDURE — 1036F TOBACCO NON-USER: CPT | Performed by: FAMILY MEDICINE

## 2020-01-03 PROCEDURE — 99213 OFFICE O/P EST LOW 20 MIN: CPT | Performed by: FAMILY MEDICINE

## 2020-01-03 PROCEDURE — 96372 THER/PROPH/DIAG INJ SC/IM: CPT | Performed by: FAMILY MEDICINE

## 2020-01-03 PROCEDURE — 81025 URINE PREGNANCY TEST: CPT | Performed by: FAMILY MEDICINE

## 2020-01-03 RX ORDER — KETOROLAC TROMETHAMINE 30 MG/ML
30 INJECTION, SOLUTION INTRAMUSCULAR; INTRAVENOUS ONCE
Status: COMPLETED | OUTPATIENT
Start: 2020-01-03 | End: 2020-01-03

## 2020-01-03 RX ORDER — NAPROXEN 500 MG/1
500 TABLET ORAL 2 TIMES DAILY WITH MEALS
Qty: 28 TABLET | Refills: 0 | Status: SHIPPED | OUTPATIENT
Start: 2020-01-03

## 2020-01-03 RX ADMIN — KETOROLAC TROMETHAMINE 30 MG: 30 INJECTION, SOLUTION INTRAMUSCULAR; INTRAVENOUS at 16:09

## 2020-01-03 NOTE — ASSESSMENT & PLAN NOTE
TSH panel reviewed I suspect the elevated reading was likely secondary to being without medication   will recheck TSH  February and adjust as needed

## 2020-01-03 NOTE — ASSESSMENT & PLAN NOTE
Suspect this may be prepatellar bursitis  With a possible arthritic component   Radiographs ordered   Toradol now for pain   instructed to use ice compresses rest when possible and use NSAIDs as needed     will await radiographic results and adjust treatment as needed, consider pain management

## 2020-01-03 NOTE — PROGRESS NOTES
//Assessment/Plan:    Acute pain of right knee   Suspect this may be prepatellar bursitis  With a possible arthritic component   Radiographs ordered   Toradol now for pain   instructed to use ice compresses rest when possible and use NSAIDs as needed  will await radiographic results and adjust treatment as needed, consider pain management    Anemia   Continue with iron and vitamin-C supplementation    Hypothyroidism   TSH panel reviewed I suspect the elevated reading was likely secondary to being without medication   will recheck TSH  February and adjust as needed       Diagnoses and all orders for this visit:    Acute pain of right knee  -     XR knee 3 vw right non injury; Future  -     naproxen (NAPROSYN) 500 mg tablet; Take 1 tablet (500 mg total) by mouth 2 (two) times a day with meals    Low back pain, unspecified back pain laterality, unspecified chronicity, unspecified whether sciatica present  -     XR spine lumbar 2 or 3 views injury; Future  -     POCT urine HCG  -     ketorolac (TORADOL) injection 30 mg          Subjective:      Patient ID: Angela Mai is a 53 y o  female  HPI     49-year-old female presents to clinic for sick visit, complaining of right knee pain states she 1st noticed it September 2017 status post fall at work she is employed as a  states she slipped  Urvashi tub did not follow up as she states pain was  Minimal to none  Over the past 2-3 months she states the pain appears to be worsening she is still currently employed as a  pain is alleviated with bending of her knee and standing for prolonged periods of time  Has taken Tylenol with minimal relief of symptoms    Denies any locking, popping recent trauma or falls      The following portions of the patient's history were reviewed and updated as appropriate: allergies, current medications, past family history, past medical history, past social history, past surgical history and problem list     Review of Systems   Constitutional: Negative for activity change, appetite change, fatigue, fever and unexpected weight change  HENT: Negative for sneezing, sore throat and tinnitus  Eyes: Negative for pain  Respiratory: Negative for cough, choking and chest tightness  Cardiovascular: Negative for chest pain, palpitations and leg swelling  Gastrointestinal: Negative for abdominal distention, abdominal pain, constipation, diarrhea, nausea and vomiting  Endocrine: Negative for polydipsia, polyphagia and polyuria  Genitourinary: Negative for dysuria and flank pain  Musculoskeletal: Positive for back pain  Neurological: Negative for dizziness, seizures, syncope, speech difficulty, light-headedness and numbness  Psychiatric/Behavioral: Negative for agitation  Objective:      /76 (BP Location: Left arm, Patient Position: Sitting, Cuff Size: Standard)   Pulse 80   Temp 97 8 °F (36 6 °C)   Resp 18   Wt 76 2 kg (168 lb)   LMP  (LMP Unknown)   SpO2 98%   BMI 23 43 kg/m²          Physical Exam   Constitutional: She is oriented to person, place, and time  No distress  Conjunctival pallor   HENT:   Head: Normocephalic and atraumatic  Right Ear: External ear normal    Left Ear: External ear normal    Eyes: Pupils are equal, round, and reactive to light  Neck: Neck supple  No JVD present  No tracheal deviation present  No thyromegaly present  Cardiovascular: Normal rate, regular rhythm and normal heart sounds  No murmur heard  Pulmonary/Chest: Effort normal  No respiratory distress  She has no wheezes  Abdominal: Soft  Bowel sounds are normal  She exhibits no distension  There is no tenderness  There is no guarding  Musculoskeletal: Normal range of motion  She exhibits no edema  Straight leg test negative   ACL PCL LCL and MCL intact   no crepitus per my exam   Neurological: She is alert and oriented to person, place, and time  Skin: Skin is warm  No rash noted   She is not diaphoretic  Psychiatric: She has a normal mood and affect

## 2020-01-16 ENCOUNTER — TELEPHONE (OUTPATIENT)
Dept: FAMILY MEDICINE CLINIC | Facility: CLINIC | Age: 45
End: 2020-01-16

## 2020-01-16 NOTE — TELEPHONE ENCOUNTER
Mailed Xrays of knee and spine to pt  It pt calls she doesn't need an apt she can go to any Urgent Care or hospital and get them done

## 2020-01-23 ENCOUNTER — TELEPHONE (OUTPATIENT)
Dept: OBGYN CLINIC | Facility: CLINIC | Age: 45
End: 2020-01-23

## 2020-03-25 ENCOUNTER — TELEPHONE (OUTPATIENT)
Dept: FAMILY MEDICINE CLINIC | Facility: CLINIC | Age: 45
End: 2020-03-25

## 2020-03-25 NOTE — TELEPHONE ENCOUNTER
Called number on file for virtual visit, went straight to voicemail, left message to call back, and update us if she is in need of any refills

## 2020-04-06 ENCOUNTER — TELEMEDICINE (OUTPATIENT)
Dept: FAMILY MEDICINE CLINIC | Facility: CLINIC | Age: 45
End: 2020-04-06

## 2020-04-06 DIAGNOSIS — Z20.828 EXPOSURE TO SARS-ASSOCIATED CORONAVIRUS: Primary | ICD-10-CM

## 2020-04-06 PROCEDURE — G2012 BRIEF CHECK IN BY MD/QHP: HCPCS | Performed by: NURSE PRACTITIONER

## 2020-04-07 ENCOUNTER — TELEMEDICINE (OUTPATIENT)
Dept: FAMILY MEDICINE CLINIC | Facility: CLINIC | Age: 45
End: 2020-04-07

## 2020-04-07 DIAGNOSIS — Z20.822 CLOSE EXPOSURE TO COVID-19 VIRUS: Primary | ICD-10-CM

## 2020-04-07 PROCEDURE — G2012 BRIEF CHECK IN BY MD/QHP: HCPCS | Performed by: NURSE PRACTITIONER

## 2020-04-07 PROCEDURE — 87635 SARS-COV-2 COVID-19 AMP PRB: CPT | Performed by: NURSE PRACTITIONER

## 2020-04-08 LAB — SARS-COV-2 RNA SPEC QL NAA+PROBE: DETECTED

## 2020-04-10 ENCOUNTER — TELEMEDICINE (OUTPATIENT)
Dept: FAMILY MEDICINE CLINIC | Facility: CLINIC | Age: 45
End: 2020-04-10

## 2020-04-10 DIAGNOSIS — U07.1 COVID-19: Primary | ICD-10-CM

## 2020-04-10 PROCEDURE — G2012 BRIEF CHECK IN BY MD/QHP: HCPCS | Performed by: NURSE PRACTITIONER

## 2020-04-10 RX ORDER — GUAIFENESIN 200 MG/1
400 TABLET ORAL EVERY 4 HOURS PRN
Qty: 30 TABLET | Refills: 0 | Status: SHIPPED | OUTPATIENT
Start: 2020-04-10

## 2020-04-10 RX ORDER — ALBUTEROL SULFATE 90 UG/1
2 AEROSOL, METERED RESPIRATORY (INHALATION) EVERY 6 HOURS PRN
Qty: 1 INHALER | Refills: 0 | Status: SHIPPED | OUTPATIENT
Start: 2020-04-10

## 2020-04-10 RX ORDER — ACETAMINOPHEN 325 MG/1
650 TABLET ORAL EVERY 6 HOURS PRN
Qty: 30 TABLET | Refills: 0 | Status: SHIPPED | OUTPATIENT
Start: 2020-04-10

## 2020-05-07 ENCOUNTER — TELEMEDICINE (OUTPATIENT)
Dept: FAMILY MEDICINE CLINIC | Facility: CLINIC | Age: 45
End: 2020-05-07

## 2020-05-07 VITALS — WEIGHT: 168 LBS | BODY MASS INDEX: 23.43 KG/M2

## 2020-05-07 DIAGNOSIS — U07.1 COVID-19: Primary | ICD-10-CM

## 2020-05-07 PROCEDURE — 99213 OFFICE O/P EST LOW 20 MIN: CPT | Performed by: NURSE PRACTITIONER

## 2020-05-07 RX ORDER — BENZONATATE 200 MG/1
200 CAPSULE ORAL 3 TIMES DAILY PRN
Qty: 20 CAPSULE | Refills: 0 | Status: SHIPPED | OUTPATIENT
Start: 2020-05-07

## 2020-07-07 ENCOUNTER — TELEPHONE (OUTPATIENT)
Dept: OBGYN CLINIC | Facility: CLINIC | Age: 45
End: 2020-07-07

## 2020-11-04 ENCOUNTER — OFFICE VISIT (OUTPATIENT)
Dept: FAMILY MEDICINE CLINIC | Facility: CLINIC | Age: 45
End: 2020-11-04

## 2020-11-04 ENCOUNTER — APPOINTMENT (OUTPATIENT)
Dept: LAB | Facility: CLINIC | Age: 45
End: 2020-11-04
Payer: COMMERCIAL

## 2020-11-04 VITALS
HEIGHT: 71 IN | DIASTOLIC BLOOD PRESSURE: 76 MMHG | TEMPERATURE: 98 F | OXYGEN SATURATION: 99 % | SYSTOLIC BLOOD PRESSURE: 102 MMHG | HEART RATE: 66 BPM | RESPIRATION RATE: 16 BRPM | WEIGHT: 178 LBS | BODY MASS INDEX: 24.92 KG/M2

## 2020-11-04 DIAGNOSIS — D64.9 ANEMIA, UNSPECIFIED TYPE: ICD-10-CM

## 2020-11-04 DIAGNOSIS — E03.8 OTHER SPECIFIED HYPOTHYROIDISM: ICD-10-CM

## 2020-11-04 DIAGNOSIS — R10.11 RUQ PAIN: ICD-10-CM

## 2020-11-04 DIAGNOSIS — R10.11 RUQ PAIN: Primary | ICD-10-CM

## 2020-11-04 DIAGNOSIS — R10.9 RIGHT SIDED ABDOMINAL PAIN: ICD-10-CM

## 2020-11-04 LAB
BASOPHILS # BLD AUTO: 0.04 THOUSANDS/ΜL (ref 0–0.1)
BASOPHILS NFR BLD AUTO: 1 % (ref 0–1)
CHOLEST SERPL-MCNC: 171 MG/DL (ref 50–200)
EOSINOPHIL # BLD AUTO: 0.08 THOUSAND/ΜL (ref 0–0.61)
EOSINOPHIL NFR BLD AUTO: 3 % (ref 0–6)
ERYTHROCYTE [DISTWIDTH] IN BLOOD BY AUTOMATED COUNT: 18.2 % (ref 11.6–15.1)
FERRITIN SERPL-MCNC: 3 NG/ML (ref 8–388)
HCT VFR BLD AUTO: 30.9 % (ref 34.8–46.1)
HDLC SERPL-MCNC: 80 MG/DL
HGB BLD-MCNC: 8.7 G/DL (ref 11.5–15.4)
IMM GRANULOCYTES # BLD AUTO: 0.01 THOUSAND/UL (ref 0–0.2)
IMM GRANULOCYTES NFR BLD AUTO: 0 % (ref 0–2)
IRON SATN MFR SERPL: 4 %
IRON SERPL-MCNC: 13 UG/DL (ref 50–170)
LDLC SERPL CALC-MCNC: 80 MG/DL (ref 0–100)
LIPASE SERPL-CCNC: 57 U/L (ref 73–393)
LYMPHOCYTES # BLD AUTO: 1.63 THOUSANDS/ΜL (ref 0.6–4.47)
LYMPHOCYTES NFR BLD AUTO: 50 % (ref 14–44)
MCH RBC QN AUTO: 19.6 PG (ref 26.8–34.3)
MCHC RBC AUTO-ENTMCNC: 28.2 G/DL (ref 31.4–37.4)
MCV RBC AUTO: 70 FL (ref 82–98)
MONOCYTES # BLD AUTO: 0.43 THOUSAND/ΜL (ref 0.17–1.22)
MONOCYTES NFR BLD AUTO: 13 % (ref 4–12)
NEUTROPHILS # BLD AUTO: 1.06 THOUSANDS/ΜL (ref 1.85–7.62)
NEUTS SEG NFR BLD AUTO: 33 % (ref 43–75)
NONHDLC SERPL-MCNC: 91 MG/DL
NRBC BLD AUTO-RTO: 0 /100 WBCS
PLATELET # BLD AUTO: 346 THOUSANDS/UL (ref 149–390)
PMV BLD AUTO: 10.8 FL (ref 8.9–12.7)
RBC # BLD AUTO: 4.43 MILLION/UL (ref 3.81–5.12)
T4 FREE SERPL-MCNC: 0.82 NG/DL (ref 0.76–1.46)
TIBC SERPL-MCNC: 358 UG/DL (ref 250–450)
TRIGL SERPL-MCNC: 56 MG/DL
TSH SERPL DL<=0.05 MIU/L-ACNC: 11.4 UIU/ML (ref 0.36–3.74)
WBC # BLD AUTO: 3.25 THOUSAND/UL (ref 4.31–10.16)

## 2020-11-04 PROCEDURE — 99213 OFFICE O/P EST LOW 20 MIN: CPT | Performed by: FAMILY MEDICINE

## 2020-11-04 PROCEDURE — 83540 ASSAY OF IRON: CPT

## 2020-11-04 PROCEDURE — 3725F SCREEN DEPRESSION PERFORMED: CPT | Performed by: FAMILY MEDICINE

## 2020-11-04 PROCEDURE — 80061 LIPID PANEL: CPT

## 2020-11-04 PROCEDURE — 36415 COLL VENOUS BLD VENIPUNCTURE: CPT

## 2020-11-04 PROCEDURE — 85025 COMPLETE CBC W/AUTO DIFF WBC: CPT

## 2020-11-04 PROCEDURE — 83690 ASSAY OF LIPASE: CPT

## 2020-11-04 PROCEDURE — 83550 IRON BINDING TEST: CPT

## 2020-11-04 PROCEDURE — 1036F TOBACCO NON-USER: CPT | Performed by: FAMILY MEDICINE

## 2020-11-04 PROCEDURE — 84443 ASSAY THYROID STIM HORMONE: CPT

## 2020-11-04 PROCEDURE — 82728 ASSAY OF FERRITIN: CPT

## 2020-11-04 PROCEDURE — 84439 ASSAY OF FREE THYROXINE: CPT

## 2020-11-06 ENCOUNTER — TELEPHONE (OUTPATIENT)
Dept: FAMILY MEDICINE CLINIC | Facility: CLINIC | Age: 45
End: 2020-11-06

## 2020-11-06 ENCOUNTER — TELEPHONE (OUTPATIENT)
Dept: SURGICAL ONCOLOGY | Facility: CLINIC | Age: 45
End: 2020-11-06

## 2020-11-06 DIAGNOSIS — E03.9 HYPOTHYROIDISM, UNSPECIFIED TYPE: ICD-10-CM

## 2020-11-06 DIAGNOSIS — D64.9 ANEMIA, UNSPECIFIED TYPE: Primary | ICD-10-CM

## 2020-11-06 RX ORDER — LEVOTHYROXINE SODIUM 112 UG/1
112 TABLET ORAL
Qty: 90 TABLET | Refills: 0 | Status: SHIPPED | OUTPATIENT
Start: 2020-11-06 | End: 2021-02-16 | Stop reason: SDUPTHER

## 2020-11-20 ENCOUNTER — HOSPITAL ENCOUNTER (OUTPATIENT)
Dept: ULTRASOUND IMAGING | Facility: HOSPITAL | Age: 45
Discharge: HOME | End: 2020-11-20
Payer: COMMERCIAL

## 2020-11-20 DIAGNOSIS — R10.11 RUQ PAIN: ICD-10-CM

## 2020-11-20 PROCEDURE — 76705 ECHO EXAM OF ABDOMEN: CPT

## 2020-11-25 ENCOUNTER — CONSULT (OUTPATIENT)
Dept: HEMATOLOGY ONCOLOGY | Facility: CLINIC | Age: 45
End: 2020-11-25
Payer: COMMERCIAL

## 2020-11-25 VITALS
WEIGHT: 179.6 LBS | HEART RATE: 58 BPM | HEIGHT: 71 IN | BODY MASS INDEX: 25.15 KG/M2 | RESPIRATION RATE: 18 BRPM | DIASTOLIC BLOOD PRESSURE: 72 MMHG | SYSTOLIC BLOOD PRESSURE: 110 MMHG | OXYGEN SATURATION: 99 % | TEMPERATURE: 98 F

## 2020-11-25 DIAGNOSIS — D50.0 IRON DEFICIENCY ANEMIA SECONDARY TO BLOOD LOSS (CHRONIC): ICD-10-CM

## 2020-11-25 DIAGNOSIS — D72.818 OTHER DECREASED WHITE BLOOD CELL (WBC) COUNT: ICD-10-CM

## 2020-11-25 DIAGNOSIS — D64.9 ANEMIA, UNSPECIFIED TYPE: ICD-10-CM

## 2020-11-25 DIAGNOSIS — N92.1 MENORRHAGIA WITH IRREGULAR CYCLE: ICD-10-CM

## 2020-11-25 DIAGNOSIS — D56.3 BETA THALASSEMIA MINOR: ICD-10-CM

## 2020-11-25 DIAGNOSIS — D50.9 MICROCYTIC ANEMIA: Primary | ICD-10-CM

## 2020-11-25 PROBLEM — D50.8 OTHER IRON DEFICIENCY ANEMIAS: Status: ACTIVE | Noted: 2020-11-25

## 2020-11-25 PROCEDURE — 3008F BODY MASS INDEX DOCD: CPT | Performed by: FAMILY MEDICINE

## 2020-11-25 PROCEDURE — 99244 OFF/OP CNSLTJ NEW/EST MOD 40: CPT | Performed by: NURSE PRACTITIONER

## 2020-11-25 PROCEDURE — 1036F TOBACCO NON-USER: CPT | Performed by: NURSE PRACTITIONER

## 2020-11-25 PROCEDURE — 3008F BODY MASS INDEX DOCD: CPT | Performed by: NURSE PRACTITIONER

## 2020-11-25 RX ORDER — SODIUM CHLORIDE 9 MG/ML
20 INJECTION, SOLUTION INTRAVENOUS ONCE
Status: CANCELLED | OUTPATIENT
Start: 2020-12-01

## 2020-12-01 ENCOUNTER — HOSPITAL ENCOUNTER (OUTPATIENT)
Dept: INFUSION CENTER | Facility: HOSPITAL | Age: 45
Discharge: HOME | End: 2020-12-01
Attending: INTERNAL MEDICINE
Payer: COMMERCIAL

## 2020-12-01 VITALS
HEART RATE: 67 BPM | SYSTOLIC BLOOD PRESSURE: 108 MMHG | DIASTOLIC BLOOD PRESSURE: 56 MMHG | RESPIRATION RATE: 18 BRPM | TEMPERATURE: 97.2 F

## 2020-12-01 DIAGNOSIS — D50.8 OTHER IRON DEFICIENCY ANEMIAS: Primary | ICD-10-CM

## 2020-12-01 PROCEDURE — 96365 THER/PROPH/DIAG IV INF INIT: CPT

## 2020-12-01 RX ORDER — SODIUM CHLORIDE 9 MG/ML
20 INJECTION, SOLUTION INTRAVENOUS ONCE
Status: COMPLETED | OUTPATIENT
Start: 2020-12-01 | End: 2020-12-01

## 2020-12-01 RX ORDER — SODIUM CHLORIDE 9 MG/ML
20 INJECTION, SOLUTION INTRAVENOUS ONCE
Status: CANCELLED | OUTPATIENT
Start: 2020-12-03

## 2020-12-01 RX ADMIN — SODIUM CHLORIDE 20 ML/HR: 0.9 INJECTION, SOLUTION INTRAVENOUS at 14:35

## 2020-12-01 RX ADMIN — IRON SUCROSE 200 MG: 20 INJECTION, SOLUTION INTRAVENOUS at 14:35

## 2020-12-03 ENCOUNTER — HOSPITAL ENCOUNTER (OUTPATIENT)
Dept: INFUSION CENTER | Facility: HOSPITAL | Age: 45
Discharge: HOME | End: 2020-12-03
Attending: INTERNAL MEDICINE
Payer: COMMERCIAL

## 2020-12-03 VITALS
DIASTOLIC BLOOD PRESSURE: 61 MMHG | HEART RATE: 66 BPM | TEMPERATURE: 97 F | SYSTOLIC BLOOD PRESSURE: 119 MMHG | RESPIRATION RATE: 16 BRPM

## 2020-12-03 DIAGNOSIS — D50.8 OTHER IRON DEFICIENCY ANEMIAS: Primary | ICD-10-CM

## 2020-12-03 PROCEDURE — 96365 THER/PROPH/DIAG IV INF INIT: CPT

## 2020-12-03 RX ORDER — SODIUM CHLORIDE 9 MG/ML
20 INJECTION, SOLUTION INTRAVENOUS ONCE
Status: COMPLETED | OUTPATIENT
Start: 2020-12-03 | End: 2020-12-03

## 2020-12-03 RX ORDER — SODIUM CHLORIDE 9 MG/ML
20 INJECTION, SOLUTION INTRAVENOUS ONCE
Status: CANCELLED | OUTPATIENT
Start: 2020-12-08

## 2020-12-03 RX ADMIN — IRON SUCROSE 200 MG: 20 INJECTION, SOLUTION INTRAVENOUS at 14:12

## 2020-12-03 RX ADMIN — SODIUM CHLORIDE 20 ML/HR: 0.9 INJECTION, SOLUTION INTRAVENOUS at 14:00

## 2020-12-08 ENCOUNTER — HOSPITAL ENCOUNTER (OUTPATIENT)
Dept: INFUSION CENTER | Facility: HOSPITAL | Age: 45
Discharge: HOME | End: 2020-12-08
Attending: INTERNAL MEDICINE
Payer: COMMERCIAL

## 2020-12-08 VITALS
RESPIRATION RATE: 16 BRPM | TEMPERATURE: 97.9 F | DIASTOLIC BLOOD PRESSURE: 59 MMHG | HEART RATE: 69 BPM | SYSTOLIC BLOOD PRESSURE: 109 MMHG

## 2020-12-08 DIAGNOSIS — D50.8 OTHER IRON DEFICIENCY ANEMIAS: Primary | ICD-10-CM

## 2020-12-08 PROCEDURE — 96365 THER/PROPH/DIAG IV INF INIT: CPT

## 2020-12-08 RX ORDER — SODIUM CHLORIDE 9 MG/ML
20 INJECTION, SOLUTION INTRAVENOUS ONCE
Status: CANCELLED | OUTPATIENT
Start: 2020-12-10

## 2020-12-08 RX ORDER — SODIUM CHLORIDE 9 MG/ML
20 INJECTION, SOLUTION INTRAVENOUS ONCE
Status: COMPLETED | OUTPATIENT
Start: 2020-12-08 | End: 2020-12-08

## 2020-12-08 RX ADMIN — IRON SUCROSE 200 MG: 20 INJECTION, SOLUTION INTRAVENOUS at 14:06

## 2020-12-08 RX ADMIN — SODIUM CHLORIDE 20 ML/HR: 0.9 INJECTION, SOLUTION INTRAVENOUS at 14:06

## 2020-12-10 ENCOUNTER — HOSPITAL ENCOUNTER (OUTPATIENT)
Dept: INFUSION CENTER | Facility: HOSPITAL | Age: 45
Discharge: HOME | End: 2020-12-10
Attending: INTERNAL MEDICINE
Payer: COMMERCIAL

## 2020-12-10 VITALS
HEART RATE: 75 BPM | SYSTOLIC BLOOD PRESSURE: 118 MMHG | TEMPERATURE: 97.7 F | RESPIRATION RATE: 18 BRPM | DIASTOLIC BLOOD PRESSURE: 59 MMHG

## 2020-12-10 DIAGNOSIS — D50.8 OTHER IRON DEFICIENCY ANEMIAS: Primary | ICD-10-CM

## 2020-12-10 PROCEDURE — 96365 THER/PROPH/DIAG IV INF INIT: CPT

## 2020-12-10 RX ORDER — SODIUM CHLORIDE 9 MG/ML
20 INJECTION, SOLUTION INTRAVENOUS ONCE
Status: COMPLETED | OUTPATIENT
Start: 2020-12-10 | End: 2020-12-10

## 2020-12-10 RX ORDER — SODIUM CHLORIDE 9 MG/ML
20 INJECTION, SOLUTION INTRAVENOUS ONCE
Status: CANCELLED | OUTPATIENT
Start: 2020-12-15

## 2020-12-10 RX ADMIN — IRON SUCROSE 200 MG: 20 INJECTION, SOLUTION INTRAVENOUS at 10:47

## 2020-12-10 RX ADMIN — SODIUM CHLORIDE 20 ML/HR: 0.9 INJECTION, SOLUTION INTRAVENOUS at 10:47

## 2020-12-15 ENCOUNTER — HOSPITAL ENCOUNTER (OUTPATIENT)
Dept: INFUSION CENTER | Facility: HOSPITAL | Age: 45
Discharge: HOME | End: 2020-12-15
Attending: INTERNAL MEDICINE

## 2020-12-18 ENCOUNTER — HOSPITAL ENCOUNTER (OUTPATIENT)
Dept: INFUSION CENTER | Facility: HOSPITAL | Age: 45
End: 2020-12-18
Attending: INTERNAL MEDICINE

## 2020-12-22 ENCOUNTER — TELEPHONE (OUTPATIENT)
Dept: OBGYN CLINIC | Facility: CLINIC | Age: 45
End: 2020-12-22

## 2020-12-24 ENCOUNTER — HOSPITAL ENCOUNTER (OUTPATIENT)
Dept: INFUSION CENTER | Facility: HOSPITAL | Age: 45
Discharge: HOME | End: 2020-12-24
Attending: INTERNAL MEDICINE
Payer: COMMERCIAL

## 2020-12-24 VITALS
DIASTOLIC BLOOD PRESSURE: 69 MMHG | TEMPERATURE: 97.9 F | HEART RATE: 66 BPM | RESPIRATION RATE: 18 BRPM | SYSTOLIC BLOOD PRESSURE: 115 MMHG

## 2020-12-24 DIAGNOSIS — D50.8 OTHER IRON DEFICIENCY ANEMIAS: Primary | ICD-10-CM

## 2020-12-24 PROCEDURE — 96365 THER/PROPH/DIAG IV INF INIT: CPT

## 2020-12-24 RX ORDER — SODIUM CHLORIDE 9 MG/ML
20 INJECTION, SOLUTION INTRAVENOUS ONCE
Status: CANCELLED | OUTPATIENT
Start: 2020-12-25

## 2020-12-24 RX ORDER — SODIUM CHLORIDE 9 MG/ML
20 INJECTION, SOLUTION INTRAVENOUS ONCE
Status: COMPLETED | OUTPATIENT
Start: 2020-12-24 | End: 2020-12-24

## 2020-12-24 RX ADMIN — IRON SUCROSE 200 MG: 20 INJECTION, SOLUTION INTRAVENOUS at 09:43

## 2020-12-24 RX ADMIN — SODIUM CHLORIDE 20 ML/HR: 0.9 INJECTION, SOLUTION INTRAVENOUS at 09:43

## 2021-01-06 ENCOUNTER — OFFICE VISIT (OUTPATIENT)
Dept: OBGYN CLINIC | Facility: CLINIC | Age: 46
End: 2021-01-06

## 2021-01-06 VITALS
SYSTOLIC BLOOD PRESSURE: 138 MMHG | WEIGHT: 170 LBS | HEIGHT: 71 IN | BODY MASS INDEX: 23.8 KG/M2 | HEART RATE: 99 BPM | DIASTOLIC BLOOD PRESSURE: 70 MMHG

## 2021-01-06 DIAGNOSIS — D50.0 IRON DEFICIENCY ANEMIA DUE TO CHRONIC BLOOD LOSS: Primary | ICD-10-CM

## 2021-01-06 DIAGNOSIS — N92.4 EXCESSIVE BLEEDING IN PREMENOPAUSAL PERIOD: ICD-10-CM

## 2021-01-06 DIAGNOSIS — R10.2 PELVIC PAIN IN FEMALE: ICD-10-CM

## 2021-01-06 DIAGNOSIS — D25.1 INTRAMURAL UTERINE FIBROID: Chronic | ICD-10-CM

## 2021-01-06 DIAGNOSIS — N92.1 EXCESSIVE AND FREQUENT MENSTRUATION WITH IRREGULAR CYCLE: ICD-10-CM

## 2021-01-06 DIAGNOSIS — D50.8 OTHER IRON DEFICIENCY ANEMIAS: ICD-10-CM

## 2021-01-06 PROBLEM — M25.561 ACUTE PAIN OF RIGHT KNEE: Status: RESOLVED | Noted: 2020-01-03 | Resolved: 2021-01-06

## 2021-01-06 PROBLEM — R10.9 RIGHT SIDED ABDOMINAL PAIN: Status: RESOLVED | Noted: 2019-01-28 | Resolved: 2021-01-06

## 2021-01-06 PROCEDURE — 3008F BODY MASS INDEX DOCD: CPT | Performed by: OBSTETRICS & GYNECOLOGY

## 2021-01-06 PROCEDURE — 99213 OFFICE O/P EST LOW 20 MIN: CPT | Performed by: OBSTETRICS & GYNECOLOGY

## 2021-01-06 RX ORDER — MEDROXYPROGESTERONE ACETATE 10 MG/1
10 TABLET ORAL DAILY
Qty: 30 TABLET | Refills: 0 | Status: SHIPPED | OUTPATIENT
Start: 2021-01-06

## 2021-01-06 RX ORDER — IBUPROFEN 600 MG/1
600 TABLET ORAL EVERY 6 HOURS PRN
Qty: 30 TABLET | Refills: 0 | Status: SHIPPED | OUTPATIENT
Start: 2021-01-06

## 2021-01-06 RX ORDER — FERROUS SULFATE 325(65) MG
325 TABLET ORAL
COMMUNITY

## 2021-01-06 RX ORDER — MULTIVITAMIN
1 TABLET ORAL DAILY
COMMUNITY

## 2021-01-06 NOTE — PROGRESS NOTES
Pt seen with   Provider that pt was scheduled with is not here today  Patient just completed 4 iron infusions and was told to f/u with Gyn  Known hx of fibroids (last u/s 2019)  Has been bleeding for several months and is complaining of cramping pain as well  Has not taken any OTC pain meds      Will start provera and ibuprofen prn and have pt f/u in 2 weeks for EMBx and possible preop eval

## 2021-02-08 ENCOUNTER — TELEPHONE (OUTPATIENT)
Dept: OBGYN CLINIC | Facility: CLINIC | Age: 46
End: 2021-02-08

## 2021-02-09 ENCOUNTER — PROCEDURE VISIT (OUTPATIENT)
Dept: OBGYN CLINIC | Facility: CLINIC | Age: 46
End: 2021-02-09

## 2021-02-09 VITALS
BODY MASS INDEX: 24.13 KG/M2 | SYSTOLIC BLOOD PRESSURE: 118 MMHG | DIASTOLIC BLOOD PRESSURE: 74 MMHG | HEART RATE: 67 BPM | WEIGHT: 173 LBS

## 2021-02-09 DIAGNOSIS — D25.1 INTRAMURAL UTERINE FIBROID: Primary | Chronic | ICD-10-CM

## 2021-02-09 DIAGNOSIS — N93.9 ABNORMAL UTERINE BLEEDING (AUB): ICD-10-CM

## 2021-02-09 PROCEDURE — 88305 TISSUE EXAM BY PATHOLOGIST: CPT | Performed by: PATHOLOGY

## 2021-02-09 PROCEDURE — 99213 OFFICE O/P EST LOW 20 MIN: CPT | Performed by: OBSTETRICS & GYNECOLOGY

## 2021-02-09 PROCEDURE — 58100 BIOPSY OF UTERUS LINING: CPT | Performed by: OBSTETRICS & GYNECOLOGY

## 2021-02-09 NOTE — PROGRESS NOTES
Endometrial biopsy    Date/Time: 2/9/2021 11:38 AM  Performed by: Jah Mireles MD  Authorized by: Jah Mireles MD   Cody Protocol:  Procedure performed by: (Dr Hailey Medrano; Supervised by Dr Karl Singh)  Consent: Written consent obtained  Risks and benefits: risks, benefits and alternatives were discussed  Consent given by: patient  Patient understanding: patient states understanding of the procedure being performed  Patient consent: the patient's understanding of the procedure matches consent given  Procedure consent: procedure consent matches procedure scheduled  Test results: test results available and properly labeled  Radiology Images displayed and confirmed  If images not available, report reviewed: imaging studies available  Required items: required blood products, implants, devices, and special equipment available      Indication:     Indications: Other disorder of menstruation and other abnormal bleeding from female genital tract    Procedure:     Procedure: endometrial biopsy with Pipelle      A bivalve speculum was placed in the vagina: yes      Cervix cleaned and prepped: yes      The cervix was dilated: no      Uterus sounded: yes      Uterus sound depth (cm):  10    Specimen collected: specimen collected and sent to pathology      Patient tolerated procedure well with no complications: yes    Findings:     Cervix: normal    Comments:     Procedure comments:  Patient presented with complaint of abnormal uterine bleeding  She was seen on 1/6/2021 due to persistent bleeding  She was prescribed Provera but never took it because she stopped bleeding after the appointment  She states that in November her bleeding was abnormal for 10 days and then she bled for the entire month of December  She had bleeding for 5 days starting Jan 21st and states that it was "normal"  She usually bleeds for 5 days  She would like a hysterectomy because she is concerned about cysts and fibroids   She is also concerned about her vaginal bleeding and cancer  She was counseled that we need to determine the etiology of her bleeding prior to counseling for a hysterectomy  She was consented for an EMB and will complete a repeat ultrasound (last ultrasound was in 2019)  Patient agrees with this plan and will return in 2 weeks for results and discussion regarding EMB and US  Rosa M Andrews MD  OB/GYN  2/9/2021  11:46 AM

## 2021-02-16 DIAGNOSIS — E03.9 HYPOTHYROIDISM, UNSPECIFIED TYPE: ICD-10-CM

## 2021-02-16 RX ORDER — LEVOTHYROXINE SODIUM 112 UG/1
112 TABLET ORAL
Qty: 90 TABLET | Refills: 1 | Status: SHIPPED | OUTPATIENT
Start: 2021-02-16 | End: 2022-01-13 | Stop reason: SDUPTHER

## 2021-03-01 ENCOUNTER — TELEPHONE (OUTPATIENT)
Dept: HEMATOLOGY ONCOLOGY | Facility: CLINIC | Age: 46
End: 2021-03-01

## 2021-03-01 NOTE — TELEPHONE ENCOUNTER
Could not leave message to remind the pt to have her labs done prior to her appt on 3/3/21  Mailbox was not set up

## 2021-03-02 ENCOUNTER — TELEPHONE (OUTPATIENT)
Dept: HEMATOLOGY ONCOLOGY | Facility: CLINIC | Age: 46
End: 2021-03-02

## 2021-03-10 DIAGNOSIS — Z23 ENCOUNTER FOR IMMUNIZATION: ICD-10-CM

## 2021-04-02 ENCOUNTER — TELEPHONE (OUTPATIENT)
Dept: HEMATOLOGY ONCOLOGY | Facility: CLINIC | Age: 46
End: 2021-04-02

## 2021-04-02 NOTE — TELEPHONE ENCOUNTER
I phoned the patient and spoke with her to remind her to have her labs completed  The patient notes that she will be unable to have them completed prior to her appointment on Monday 4/5  Per Vicky Wilson, the patient was instructed to phone the office upon having the labs completed in order to make a follow up appointment  The patient notes that she will get the labs completed on Wednesday and was instructed again to phone when they are completed to schedule a follow up  The patient expressed understanding

## 2021-04-05 ENCOUNTER — HOSPITAL ENCOUNTER (OUTPATIENT)
Dept: ULTRASOUND IMAGING | Facility: HOSPITAL | Age: 46
Discharge: HOME | End: 2021-04-05
Payer: COMMERCIAL

## 2021-04-05 DIAGNOSIS — D25.1 INTRAMURAL UTERINE FIBROID: Chronic | ICD-10-CM

## 2021-04-05 PROCEDURE — 76856 US EXAM PELVIC COMPLETE: CPT

## 2021-04-05 PROCEDURE — 76830 TRANSVAGINAL US NON-OB: CPT

## 2021-04-14 ENCOUNTER — TELEPHONE (OUTPATIENT)
Dept: OBGYN CLINIC | Facility: CLINIC | Age: 46
End: 2021-04-14

## 2021-04-15 ENCOUNTER — TELEPHONE (OUTPATIENT)
Dept: OBGYN CLINIC | Facility: CLINIC | Age: 46
End: 2021-04-15

## 2021-04-21 ENCOUNTER — IMMUNIZATIONS (OUTPATIENT)
Dept: FAMILY MEDICINE CLINIC | Facility: HOSPITAL | Age: 46
End: 2021-04-21

## 2021-04-21 DIAGNOSIS — Z23 ENCOUNTER FOR IMMUNIZATION: Primary | ICD-10-CM

## 2021-04-21 PROCEDURE — 0011A SARS-COV-2 / COVID-19 MRNA VACCINE (MODERNA) 100 MCG: CPT

## 2021-04-21 PROCEDURE — 91301 SARS-COV-2 / COVID-19 MRNA VACCINE (MODERNA) 100 MCG: CPT

## 2021-04-26 NOTE — PROGRESS NOTES
Subjective:     Booker Evans is a 50 y o  [id number] female who presents for follow up for post-menopausal bleeding  EMB showed benign secretory endometrium  TVUS was significant for  fibroid uterus, with several small myomas  A small, complex right ovarian cyst was also noted  The patient states that since she was last seen, she is not having vaginal bleeding  She is having some suprapubic pain, which she describes as "burning"  Objective:    Vitals:  Vitals:    04/27/21 0909   BP: 111/73   Pulse: 65     Physical Exam  Constitutional:       General: She is not in acute distress  Appearance: Normal appearance  She is normal weight  She is not ill-appearing, toxic-appearing or diaphoretic  Neurological:      General: No focal deficit present  Mental Status: She is alert and oriented to person, place, and time  Psychiatric:         Mood and Affect: Mood normal          Behavior: Behavior normal          Thought Content: Thought content normal          Judgment: Judgment normal           Us Pelvis Complete W Transvaginal    Result Date: 4/10/2021  Impression:  Heterogeneous uterus with exophytic fibroids  Complex cyst in the right ovary  No follow-up recommended     Assessment/Plan:    Booker Evans is a 50 y o  [id number] female who presents for follow up for post-menopausal bleeding  - EMB and TVUS results reviewed today  TVUS significant for multiple small fibroids  Small complex cyst of right ovary noted  - The patient is no longer symptomatic  We discussed medical versus surgical management options  The patient is undecided about what she would like to do  Given that she is now symptomatic, she would like some time to think about whether she would like to pursue definitive management  She will contact the clinic when she has made  Decision             Deepa Tyler MD  4/26/2021  11:03 AM

## 2021-04-27 ENCOUNTER — OFFICE VISIT (OUTPATIENT)
Dept: OBGYN CLINIC | Facility: CLINIC | Age: 46
End: 2021-04-27

## 2021-04-27 VITALS
SYSTOLIC BLOOD PRESSURE: 111 MMHG | BODY MASS INDEX: 24.97 KG/M2 | HEART RATE: 65 BPM | WEIGHT: 179 LBS | DIASTOLIC BLOOD PRESSURE: 73 MMHG

## 2021-04-27 DIAGNOSIS — D25.1 INTRAMURAL UTERINE FIBROID: Primary | Chronic | ICD-10-CM

## 2021-04-27 PROCEDURE — 99213 OFFICE O/P EST LOW 20 MIN: CPT | Performed by: OBSTETRICS & GYNECOLOGY

## 2021-05-27 ENCOUNTER — IMMUNIZATIONS (OUTPATIENT)
Dept: FAMILY MEDICINE CLINIC | Facility: HOSPITAL | Age: 46
End: 2021-05-27

## 2021-05-27 DIAGNOSIS — Z23 ENCOUNTER FOR IMMUNIZATION: Primary | ICD-10-CM

## 2021-05-27 PROCEDURE — 0012A SARS-COV-2 / COVID-19 MRNA VACCINE (MODERNA) 100 MCG: CPT

## 2021-05-27 PROCEDURE — 91301 SARS-COV-2 / COVID-19 MRNA VACCINE (MODERNA) 100 MCG: CPT

## 2021-06-23 PROBLEM — D56.3 BETA THALASSEMIA MINOR: Status: ACTIVE | Noted: 2021-06-23

## 2021-07-02 ENCOUNTER — OFFICE VISIT (OUTPATIENT)
Dept: FAMILY MEDICINE CLINIC | Facility: CLINIC | Age: 46
End: 2021-07-02

## 2021-07-02 VITALS
TEMPERATURE: 97.3 F | BODY MASS INDEX: 25.2 KG/M2 | DIASTOLIC BLOOD PRESSURE: 60 MMHG | SYSTOLIC BLOOD PRESSURE: 102 MMHG | RESPIRATION RATE: 16 BRPM | WEIGHT: 180 LBS | HEIGHT: 71 IN | OXYGEN SATURATION: 99 % | HEART RATE: 63 BPM

## 2021-07-02 DIAGNOSIS — D50.0 IRON DEFICIENCY ANEMIA DUE TO CHRONIC BLOOD LOSS: Primary | ICD-10-CM

## 2021-07-02 PROCEDURE — 99213 OFFICE O/P EST LOW 20 MIN: CPT | Performed by: FAMILY MEDICINE

## 2021-07-02 NOTE — PATIENT INSTRUCTIONS
Anemia   CUIDADO AMBULATORIO:   Anemia es un conteo bajo de los glóbulos rojos o sania cantidad baja de hemoglobina en cindy glóbulos rojos  La hemoglobina es sania proteína que ayuda a transportar el oxígeno a través de madrid cuerpo  Los glóbulos rojos usan el sophia para crear hemoglobina  La anemia podría desarrollarse si madrid cuerpo no tiene suficiente sophia  También podría desarrollarse si madrid cuerpo no produce suficientes glóbulos rojos o si ellos mueren antes de que madrid cuerpo pueda producirlos  Los síntomas más comunes incluyen los siguientes:  · Dolor en el pecho o ritmo cardíaco acelerado    · Desvanecimientos, mareos o falta de aire    · Piel fría o pálida    · Cansancio, debilidad o confusión    Llame al 911 o carmelo que alguien llame al 911 en cualquiera de los siguientes casos:  · Usted pierde el conocimiento  · Usted tiene un dolor intenso en el pecho  Busque atención médica de inmediato si:  · Usted tiene evacuaciones intestinales oscuras o con marni  Comuníquese con madrid médico si:  · Cindy síntomas empeoran, aún después del [place]  · Usted tiene preguntas o inquietudes acerca de madrid condición o cuidado  El tratamiento para la anemia podría incluir cualquiera de los siguientes:  · Suplementos de sophia o ácido fólico ayudan a aumentar cindy glóbulos rojos y los niveles de hemoglobina  · Inyecciones de vitamina B12 podrían ayudar a aumentar el conteo de cindy glóbulos rojos y a disminuir cindy síntomas  Pregunte a madrid médico cómo se inyecta la B12  Evite la anemia: Consuma alimentos sanos altos en sophia y vitamina C  Eugena Michelle, vegetales de hojas kenneth oscuro y frijoles son altos en sophia y proteína  La vitamina C ayuda a madrid cuerpo a absorber el sophia  Los [employer] en vitamina C incluyen naranjas y otros cítricos  [place] a madrid médico sania lista de otros alimentos altos en sophia y vitamina C   Pregunte si usted necesita llevar sania dieta especial   Melany Bullocks a cindy consultas de control con madrid médico según le indicaron  Anote bob preguntas para que se acuerde de hacerlas more bob visitas  © Copyright [address] Information is for End User's use only and may not be sold, redistributed or otherwise used for commercial purposes  All illustrations and images included in CareNotes® are the copyrighted property of [employer] , [employer]  or [address] [place] [address] es sólo para uso en educación  [place] intención no es darle un consejo médico sobre enfermedades o tratamientos  Colsulte con madrid [employer] farm[employer] antes de seguir cualquier régimen médico para saber si es seguro y efectivo para usted

## 2021-07-02 NOTE — PROGRESS NOTES
Assessment/Plan:    Anemia  Currently on ferrous sulfate 325 mg daily and vit c   Reports compliance, asymptomatic  Was seen by HEMA/ONC, labs were ordered but pt has been unable to f/u with her pending test    Patient with Hx of BetaThalassemia minor     Encourage the patient to perform her labs as soon as possible for better understanding of her anemia  Will order CBC and CMP, plus labs pending recommended by her hematologist    F/U in 2 weeks for further evaluation  was reccommended to performed the labs including the recommended by her hematologist      Hypothyroidism  TSH 11 4 on 11/20  Suboptimally controlled  Patient asymptomatic  Reviewed  target goal with patient  Currently on levothyroxine 112 mcg      -Continue continue treatment  -Repeat TSH  -Reassess medication once result are available  Diagnoses and all orders for this visit:    Iron deficiency anemia due to chronic blood loss  -     CBC and differential; Future  -     Iron Panel (Includes Ferritin, Iron Sat%, Iron, and TIBC); Future  -     Basic metabolic panel; Future          Subjective:      Patient ID: Carlos Garcia is a 50 y o  female  This is a pleasant 49 yo female with PMH that includes hypothyroidism, iron deficiency anemia, and uterine fibroids, who presents to the clinic to f/u of her anemia  The patient states that she has a long-standing hx of anemia presumed due to her beta-thalassemia  Although she sometimes feels fatigated, the patient states that her symptoms have worsened since she was diagnosed with covid-19 last year 4/20  The patient was also diagnosed with hypothyroidism, currently on levothyroxine, and is compliant with her medication  The last TSH level was 11/20 wnl  Overall patient states that is feeling well, besides her fatigue, the patient reports occasional chest pain and shortness of breath, currently she is she denies symptoms   She follows with Hema/Onc; the last appointment was postponed because the patient needs to perform her lab work previously ordered at her last visit  Recently was seen (4/21) for post-menopausal bleeding and was found to have multiple small fibroids and complex cyst of right ovary noted  Today the patient denies fatigue, headaches, dizziness, blurred vision, nausea, palpitation, chest pain, SOB, urinary changes, weakness, bowel changes, sleep problems, hemoptysis, hematemesis, melena, hematochezia, hematuria, back pain or hair loss  Patient reports that her menstrual cycle is usually 26 days lasting for 5 days last menstrual cycle was the 26 of June  No vaginal bleeding or discharge,   Assessment/Plan:    Anemia  Currently on ferrous sulfate 325 mg daily and vit c   Reports compliance, asymptomatic  Was seen by HEMA/ONC, labs were ordered but pt has been unable to f/u with her pending test    Patient with Hx of BetaThalassemia minor     Encourage the patient to perform her labs as soon as possible for better understanding of her anemia  Will order CBC and CMP, plus labs pending recommended by her hematologist    F/U in 2 weeks for further evaluation  was reccommended to performed the labs including the recommended by her hematologist      Hypothyroidism  TSH 11 4 on 11/20  Suboptimally controlled  Patient asymptomatic  Reviewed  target goal with patient  Currently on levothyroxine 112 mcg      -Continue continue treatment  -Repeat TSH  -Reassess medication once result are available  Diagnoses and all orders for this visit:    Iron deficiency anemia due to chronic blood loss  -     CBC and differential; Future  -     Iron Panel (Includes Ferritin, Iron Sat%, Iron, and TIBC); Future  -     Basic metabolic panel; Future          Subjective:      Patient ID: Aida Miller is a 50 y o  female              The following portions of the patient's history were reviewed and updated as appropriate: allergies, current medications, past family history, past medical history, past social history, past surgical history and problem list     Review of Systems   Constitutional: Negative for chills and fever  HENT: Negative for ear pain and sore throat  Eyes: Negative for pain and visual disturbance  Respiratory: Negative for cough and shortness of breath  Cardiovascular: Negative for chest pain and palpitations  Gastrointestinal: Negative for abdominal pain and vomiting  Genitourinary: Negative for dysuria and hematuria  Musculoskeletal: Negative for arthralgias and back pain  Skin: Negative for color change and rash  Neurological: Negative for seizures and syncope  All other systems reviewed and are negative  Objective:      /60 (BP Location: Left arm, Patient Position: Sitting, Cuff Size: Standard)   Pulse 63   Temp (!) 97 3 °F (36 3 °C) (Temporal)   Resp 16   Ht 5' 11" (1 803 m)   Wt 81 6 kg (180 lb)   LMP 06/26/2021 (Exact Date)   SpO2 99%   Breastfeeding No   BMI 25 10 kg/m²          Physical Exam  Vitals and nursing note reviewed  Constitutional:       Appearance: Normal appearance  HENT:      Head: Normocephalic and atraumatic  Nose: Nose normal       Mouth/Throat:      Mouth: Mucous membranes are moist       Pharynx: Oropharynx is clear  No oropharyngeal exudate or posterior oropharyngeal erythema  Eyes:      Extraocular Movements: Extraocular movements intact  Conjunctiva/sclera: Conjunctivae normal       Pupils: Pupils are equal, round, and reactive to light  Cardiovascular:      Rate and Rhythm: Normal rate and regular rhythm  Pulses: Normal pulses  Heart sounds: Normal heart sounds  Pulmonary:      Effort: Pulmonary effort is normal       Breath sounds: Normal breath sounds  Abdominal:      General: Bowel sounds are normal       Palpations: Abdomen is soft  Musculoskeletal:         General: Normal range of motion  Cervical back: Normal range of motion and neck supple  Skin:     General: Skin is warm  Capillary Refill: Capillary refill takes less than 2 seconds  Coloration: Skin is not jaundiced or pale  Findings: No bruising, erythema, lesion or rash  Neurological:      General: No focal deficit present  Mental Status: She is alert and oriented to person, place, and time  Psychiatric:         Mood and Affect: Mood normal            The following portions of the patient's history were reviewed and updated as appropriate: allergies, current medications, past family history, past medical history, past social history, past surgical history and problem list     Review of Systems   Constitutional: Negative for chills and fever  HENT: Negative for ear pain and sore throat  Eyes: Negative for pain and visual disturbance  Respiratory: Negative for cough and shortness of breath  Cardiovascular: Negative for chest pain and palpitations  Gastrointestinal: Negative for abdominal pain and vomiting  Genitourinary: Negative for dysuria and hematuria  Musculoskeletal: Negative for arthralgias and back pain  Skin: Negative for color change and rash  Neurological: Negative for seizures and syncope  All other systems reviewed and are negative  Objective:      /60 (BP Location: Left arm, Patient Position: Sitting, Cuff Size: Standard)   Pulse 63   Temp (!) 97 3 °F (36 3 °C) (Temporal)   Resp 16   Ht 5' 11" (1 803 m)   Wt 81 6 kg (180 lb)   LMP 06/26/2021 (Exact Date)   SpO2 99%   Breastfeeding No   BMI 25 10 kg/m²          Physical Exam  Vitals and nursing note reviewed  Constitutional:       Appearance: Normal appearance  HENT:      Head: Normocephalic and atraumatic  Nose: Nose normal       Mouth/Throat:      Mouth: Mucous membranes are moist       Pharynx: Oropharynx is clear  No oropharyngeal exudate or posterior oropharyngeal erythema  Eyes:      Extraocular Movements: Extraocular movements intact  Conjunctiva/sclera: Conjunctivae normal       Pupils: Pupils are equal, round, and reactive to light  Cardiovascular:      Rate and Rhythm: Normal rate and regular rhythm  Pulses: Normal pulses  Heart sounds: Normal heart sounds  Pulmonary:      Effort: Pulmonary effort is normal       Breath sounds: Normal breath sounds  Abdominal:      General: Bowel sounds are normal       Palpations: Abdomen is soft  Musculoskeletal:         General: Normal range of motion  Cervical back: Normal range of motion and neck supple  Skin:     General: Skin is warm  Capillary Refill: Capillary refill takes less than 2 seconds  Coloration: Skin is not jaundiced or pale  Findings: No bruising, erythema, lesion or rash  Neurological:      General: No focal deficit present  Mental Status: She is alert and oriented to person, place, and time     Psychiatric:         Mood and Affect: Mood normal

## 2021-07-06 NOTE — ASSESSMENT & PLAN NOTE
TSH 11 4 on 11/20  Suboptimally controlled  Patient asymptomatic  Reviewed  target goal with patient  Currently on levothyroxine 112 mcg      -Continue continue treatment  -Repeat TSH  -Reassess medication once result are available

## 2021-07-19 ENCOUNTER — APPOINTMENT (OUTPATIENT)
Dept: LAB | Facility: HOSPITAL | Age: 46
End: 2021-07-19
Payer: COMMERCIAL

## 2021-07-19 DIAGNOSIS — D56.3 BETA THALASSEMIA MINOR: ICD-10-CM

## 2021-07-19 DIAGNOSIS — D72.818 OTHER DECREASED WHITE BLOOD CELL (WBC) COUNT: ICD-10-CM

## 2021-07-19 DIAGNOSIS — D50.0 IRON DEFICIENCY ANEMIA DUE TO CHRONIC BLOOD LOSS: ICD-10-CM

## 2021-07-19 DIAGNOSIS — D50.9 MICROCYTIC ANEMIA: ICD-10-CM

## 2021-07-19 LAB
ALBUMIN SERPL BCP-MCNC: 4.1 G/DL (ref 3–5.2)
ALP SERPL-CCNC: 44 U/L (ref 43–122)
ALT SERPL W P-5'-P-CCNC: 13 U/L
ANION GAP SERPL CALCULATED.3IONS-SCNC: 7 MMOL/L (ref 5–14)
ANISOCYTOSIS BLD QL SMEAR: PRESENT
AST SERPL W P-5'-P-CCNC: 21 U/L (ref 14–36)
BASOPHILS # BLD AUTO: 0 THOUSANDS/ΜL (ref 0–0.1)
BASOPHILS NFR BLD AUTO: 2 % (ref 0–1)
BILIRUB SERPL-MCNC: 0.84 MG/DL
BUN SERPL-MCNC: 7 MG/DL (ref 5–25)
CALCIUM SERPL-MCNC: 9.3 MG/DL (ref 8.4–10.2)
CHLORIDE SERPL-SCNC: 105 MMOL/L (ref 97–108)
CO2 SERPL-SCNC: 28 MMOL/L (ref 22–30)
CREAT SERPL-MCNC: 0.57 MG/DL (ref 0.6–1.2)
CRP SERPL QL: <5 MG/L
DAT POLY-SP REAG RBC QL: NEGATIVE
EOSINOPHIL # BLD AUTO: 0.1 THOUSAND/ΜL (ref 0–0.4)
EOSINOPHIL NFR BLD AUTO: 4 % (ref 0–6)
ERYTHROCYTE [DISTWIDTH] IN BLOOD BY AUTOMATED COUNT: 15.7 %
ERYTHROCYTE [SEDIMENTATION RATE] IN BLOOD: 12 MM/HOUR (ref 0–19)
FERRITIN SERPL-MCNC: 9 NG/ML (ref 8–388)
FOLATE SERPL-MCNC: >20 NG/ML (ref 3.1–17.5)
GFR SERPL CREATININE-BSD FRML MDRD: 128 ML/MIN/1.73SQ M
GLUCOSE P FAST SERPL-MCNC: 66 MG/DL (ref 70–99)
HBV CORE AB SER QL: NORMAL
HBV CORE IGM SER QL: NORMAL
HBV SURFACE AG SER QL: NORMAL
HCT VFR BLD AUTO: 32.7 % (ref 36–46)
HCV AB SER QL: NORMAL
HGB BLD-MCNC: 10.1 G/DL (ref 12–16)
HYPERCHROMIA BLD QL SMEAR: PRESENT
IGA SERPL-MCNC: 222 MG/DL (ref 70–400)
IGG SERPL-MCNC: 1470 MG/DL (ref 700–1600)
IGM SERPL-MCNC: 190 MG/DL (ref 40–230)
IRON SATN MFR SERPL: 45 %
IRON SERPL-MCNC: 131 UG/DL (ref 50–170)
LDH SERPL-CCNC: 329 U/L (ref 313–618)
LYMPHOCYTES # BLD AUTO: 1.1 THOUSANDS/ΜL (ref 0.5–4)
LYMPHOCYTES NFR BLD AUTO: 44 % (ref 25–45)
MCH RBC QN AUTO: 21.9 PG (ref 26–34)
MCHC RBC AUTO-ENTMCNC: 31 G/DL (ref 31–36)
MCV RBC AUTO: 71 FL (ref 80–100)
MICROCYTES BLD QL AUTO: PRESENT
MONOCYTES # BLD AUTO: 0.3 THOUSAND/ΜL (ref 0.2–0.9)
MONOCYTES NFR BLD AUTO: 12 % (ref 1–10)
NEUTROPHILS # BLD AUTO: 1 THOUSANDS/ΜL (ref 1.8–7.8)
NEUTS SEG NFR BLD AUTO: 39 % (ref 45–65)
PLATELET # BLD AUTO: 252 THOUSANDS/UL (ref 150–450)
PLATELET BLD QL SMEAR: ADEQUATE
PMV BLD AUTO: 8.7 FL (ref 8.9–12.7)
POTASSIUM SERPL-SCNC: 4.1 MMOL/L (ref 3.6–5)
PROT SERPL-MCNC: 7.4 G/DL (ref 5.9–8.4)
RBC # BLD AUTO: 4.63 MILLION/UL (ref 4–5.2)
RBC MORPH BLD: NORMAL
RETICS # CALC: 1.49 % (ref 0.87–2.63)
SODIUM SERPL-SCNC: 140 MMOL/L (ref 137–147)
TIBC SERPL-MCNC: 290 UG/DL (ref 250–450)
VIT B12 SERPL-MCNC: 835 PG/ML (ref 100–900)
WBC # BLD AUTO: 2.6 THOUSAND/UL (ref 4.5–11)

## 2021-07-19 PROCEDURE — 83540 ASSAY OF IRON: CPT

## 2021-07-19 PROCEDURE — 82728 ASSAY OF FERRITIN: CPT

## 2021-07-19 PROCEDURE — 84165 PROTEIN E-PHORESIS SERUM: CPT

## 2021-07-19 PROCEDURE — 36415 COLL VENOUS BLD VENIPUNCTURE: CPT

## 2021-07-19 PROCEDURE — 83615 LACTATE (LD) (LDH) ENZYME: CPT

## 2021-07-19 PROCEDURE — 86140 C-REACTIVE PROTEIN: CPT

## 2021-07-19 PROCEDURE — 85025 COMPLETE CBC W/AUTO DIFF WBC: CPT

## 2021-07-19 PROCEDURE — 82784 ASSAY IGA/IGD/IGG/IGM EACH: CPT

## 2021-07-19 PROCEDURE — 82746 ASSAY OF FOLIC ACID SERUM: CPT

## 2021-07-19 PROCEDURE — 86038 ANTINUCLEAR ANTIBODIES: CPT

## 2021-07-19 PROCEDURE — 85045 AUTOMATED RETICULOCYTE COUNT: CPT

## 2021-07-19 PROCEDURE — 86255 FLUORESCENT ANTIBODY SCREEN: CPT

## 2021-07-19 PROCEDURE — 86430 RHEUMATOID FACTOR TEST QUAL: CPT

## 2021-07-19 PROCEDURE — 82607 VITAMIN B-12: CPT

## 2021-07-19 PROCEDURE — 84165 PROTEIN E-PHORESIS SERUM: CPT | Performed by: PATHOLOGY

## 2021-07-19 PROCEDURE — 86704 HEP B CORE ANTIBODY TOTAL: CPT

## 2021-07-19 PROCEDURE — 85652 RBC SED RATE AUTOMATED: CPT

## 2021-07-19 PROCEDURE — 87340 HEPATITIS B SURFACE AG IA: CPT

## 2021-07-19 PROCEDURE — 86705 HEP B CORE ANTIBODY IGM: CPT

## 2021-07-19 PROCEDURE — 83883 ASSAY NEPHELOMETRY NOT SPEC: CPT

## 2021-07-19 PROCEDURE — 83550 IRON BINDING TEST: CPT

## 2021-07-19 PROCEDURE — 80053 COMPREHEN METABOLIC PANEL: CPT

## 2021-07-19 PROCEDURE — 83010 ASSAY OF HAPTOGLOBIN QUANT: CPT

## 2021-07-19 PROCEDURE — 83516 IMMUNOASSAY NONANTIBODY: CPT

## 2021-07-19 PROCEDURE — 86803 HEPATITIS C AB TEST: CPT

## 2021-07-19 PROCEDURE — 86880 COOMBS TEST DIRECT: CPT

## 2021-07-20 LAB
ENDOMYSIUM IGA SER QL: NEGATIVE
GLIADIN PEPTIDE IGA SER-ACNC: 5 UNITS (ref 0–19)
GLIADIN PEPTIDE IGG SER-ACNC: 1 UNITS (ref 0–19)
HAPTOGLOB SERPL-MCNC: 99 MG/DL (ref 42–296)
IGA SERPL-MCNC: 224 MG/DL (ref 87–352)
KAPPA LC FREE SER-MCNC: 19.1 MG/L (ref 3.3–19.4)
KAPPA LC FREE/LAMBDA FREE SER: 0.96 {RATIO} (ref 0.26–1.65)
LAMBDA LC FREE SERPL-MCNC: 19.8 MG/L (ref 5.7–26.3)
RHEUMATOID FACT SER QL LA: NEGATIVE
TTG IGA SER-ACNC: <2 U/ML (ref 0–3)
TTG IGG SER-ACNC: 10 U/ML (ref 0–5)

## 2021-07-21 LAB
ALBUMIN SERPL ELPH-MCNC: 4.17 G/DL (ref 3.5–5)
ALBUMIN SERPL ELPH-MCNC: 58.8 % (ref 52–65)
ALPHA1 GLOB SERPL ELPH-MCNC: 0.23 G/DL (ref 0.1–0.4)
ALPHA1 GLOB SERPL ELPH-MCNC: 3.2 % (ref 2.5–5)
ALPHA2 GLOB SERPL ELPH-MCNC: 0.57 G/DL (ref 0.4–1.2)
ALPHA2 GLOB SERPL ELPH-MCNC: 8 % (ref 7–13)
BETA GLOB ABNORMAL SERPL ELPH-MCNC: 0.4 G/DL (ref 0.4–0.8)
BETA1 GLOB SERPL ELPH-MCNC: 5.6 % (ref 5–13)
BETA2 GLOB SERPL ELPH-MCNC: 4.2 % (ref 2–8)
BETA2+GAMMA GLOB SERPL ELPH-MCNC: 0.3 G/DL (ref 0.2–0.5)
GAMMA GLOB ABNORMAL SERPL ELPH-MCNC: 1.43 G/DL (ref 0.5–1.6)
GAMMA GLOB SERPL ELPH-MCNC: 20.2 % (ref 12–22)
IGG/ALB SER: 1.43 {RATIO} (ref 1.1–1.8)
PROT PATTERN SERPL ELPH-IMP: NORMAL
PROT SERPL-MCNC: 7.1 G/DL (ref 6.4–8.2)
RYE IGE QN: NEGATIVE

## 2021-09-20 ENCOUNTER — OFFICE VISIT (OUTPATIENT)
Dept: FAMILY MEDICINE CLINIC | Facility: CLINIC | Age: 46
End: 2021-09-20

## 2021-09-20 VITALS
WEIGHT: 180 LBS | DIASTOLIC BLOOD PRESSURE: 78 MMHG | OXYGEN SATURATION: 98 % | BODY MASS INDEX: 25.2 KG/M2 | SYSTOLIC BLOOD PRESSURE: 118 MMHG | RESPIRATION RATE: 16 BRPM | HEIGHT: 71 IN | HEART RATE: 78 BPM | TEMPERATURE: 98 F

## 2021-09-20 DIAGNOSIS — E03.8 OTHER SPECIFIED HYPOTHYROIDISM: ICD-10-CM

## 2021-09-20 DIAGNOSIS — Z12.31 BREAST CANCER SCREENING BY MAMMOGRAM: ICD-10-CM

## 2021-09-20 DIAGNOSIS — R76.8 ELEVATED ANTI-TISSUE TRANSGLUTAMINASE (TTG) IGA LEVEL: Primary | ICD-10-CM

## 2021-09-20 PROCEDURE — 99213 OFFICE O/P EST LOW 20 MIN: CPT | Performed by: INTERNAL MEDICINE

## 2021-09-20 NOTE — PROGRESS NOTES
Assessment/Plan:    Elevated anti-tissue transglutaminase (tTG) IgA level  Assessment  Elevated transglutaminase, likely as result of celiac disease  Further evaluation might be needed for final diagnosis including intestinal biopsies  Does not follows with GI, Currently not on gluten free diet  Plan   Will refer her to GI for further evaluation  Will refer her to nutrition services  Recommended to gluten free diet  Reassess for symptoms at her next visit in 3 months  Hypothyroidism  Assessment  TSH 11 4 on 11/20  Suboptimally controlled, asymptomatic  Reviewed  target goal with patient  Currently on levothyroxine 112 mcg    Plan  -Continue continue treatment     -Will order TSH with free T4  -Reassess medication once result are available  Diagnoses and all orders for this visit:    Elevated anti-tissue transglutaminase (tTG) IgA level  -     Ambulatory referral to Gastroenterology; Future  -     Ambulatory referral to Nutrition Services; Future    Breast cancer screening by mammogram  -     Mammo screening bilateral w 3d & cad; Future    Other specified hypothyroidism  -     TSH + Free T4; Future          Subjective:      Patient ID: Armond Valle is a 50 y o  female  Armond Valle is a 50 y o  Female with PMH significant for hypothyroidism, intramural uterine fibroids, iron deficiency anemia, who presents to f/u on her labs' results and for management of her chronic medical condition(s)  Today the patient reports no complaints  At her last office visit, labs were ordered including  RF, LEAH, Chronic hepatitis panel, cyanocobalamin level, CMP, ESR, CRP, folate, Iron panel, haptoglobin, IgG, IgA, and IgM levels that resulted with normal limits  CBC was relevant for mild microcytic anemia and elevation of transglut ab  The patient's medical conditions are stable unless noted otherwise above    The patient has not had any recent hospitalizations or medical emergencies since the last visit  Patient reports being compliant with her medications  Overall patient reports feeling well  The patient has no further complaints other than what is mentioned in the ROS  The following portions of the patient's history were reviewed and updated as appropriate: allergies, current medications, past family history, past medical history, past social history, past surgical history and problem list     Review of Systems   Constitutional: Negative for activity change, chills, diaphoresis, fatigue, fever and unexpected weight change  Eyes: Negative for visual disturbance  Respiratory: Negative for cough, chest tightness, shortness of breath and wheezing  Cardiovascular: Negative for chest pain, palpitations and leg swelling  Gastrointestinal: Negative for abdominal distention, abdominal pain, anal bleeding, blood in stool, constipation, diarrhea, nausea, rectal pain and vomiting  Endocrine: Negative for cold intolerance and heat intolerance  Genitourinary: Negative for difficulty urinating, dysuria and flank pain  Musculoskeletal: Negative for arthralgias  Skin: Negative for color change and pallor  Neurological: Negative for dizziness, speech difficulty and weakness  Psychiatric/Behavioral: Negative for sleep disturbance and suicidal ideas  Objective:       /78 (BP Location: Left arm, Patient Position: Sitting, Cuff Size: Standard)   Pulse 78   Temp 98 °F (36 7 °C) (Temporal)   Resp 16   Ht 5' 11" (1 803 m)   Wt 81 6 kg (180 lb)   SpO2 98%   BMI 25 10 kg/m²          Physical Exam  Vitals and nursing note reviewed  Constitutional:       General: She is awake  She is not in acute distress  Appearance: Normal appearance  She is well-developed and well-groomed  She is not ill-appearing, toxic-appearing or diaphoretic  HENT:      Head: Normocephalic and atraumatic        Nose: Nose normal       Mouth/Throat:      Mouth: Mucous membranes are moist  Pharynx: Oropharynx is clear  No oropharyngeal exudate or posterior oropharyngeal erythema  Eyes:      Extraocular Movements: Extraocular movements intact  Conjunctiva/sclera: Conjunctivae normal       Pupils: Pupils are equal, round, and reactive to light  Cardiovascular:      Rate and Rhythm: Normal rate and regular rhythm  No extrasystoles are present  Pulses: Normal pulses  Radial pulses are 2+ on the right side and 2+ on the left side  Heart sounds: Normal heart sounds, S1 normal and S2 normal    Pulmonary:      Effort: Pulmonary effort is normal       Breath sounds: Normal breath sounds and air entry  Abdominal:      General: Bowel sounds are normal       Palpations: Abdomen is soft  There is no mass  Tenderness: There is no abdominal tenderness  There is no right CVA tenderness, left CVA tenderness or guarding  Musculoskeletal:         General: Normal range of motion  Cervical back: Normal range of motion  Right lower leg: No edema  Left lower leg: No edema  Skin:     General: Skin is warm  Capillary Refill: Capillary refill takes less than 2 seconds  Coloration: Skin is not jaundiced or pale  Findings: No bruising, erythema, lesion or rash  Neurological:      General: No focal deficit present  Mental Status: She is alert  Psychiatric:         Behavior: Behavior is cooperative

## 2021-09-21 PROBLEM — R76.8 ELEVATED ANTI-TISSUE TRANSGLUTAMINASE (TTG) IGA LEVEL: Status: ACTIVE | Noted: 2021-09-21

## 2021-09-21 NOTE — ASSESSMENT & PLAN NOTE
Assessment  Elevated transglutaminase, likely as result of celiac disease  Further evaluation might be needed for final diagnosis including intestinal biopsies  Does not follows with GI, Currently not on gluten free diet  Plan   Will refer her to GI for further evaluation  Will refer her to nutrition services  Recommended to gluten free diet  Reassess for symptoms at her next visit in 3 months

## 2021-09-21 NOTE — ASSESSMENT & PLAN NOTE
Assessment  TSH 11 4 on 11/20  Suboptimally controlled, asymptomatic  Reviewed  target goal with patient  Currently on levothyroxine 112 mcg    Plan  -Continue continue treatment     -Will order TSH with free T4  -Reassess medication once result are available

## 2021-10-07 ENCOUNTER — CLINICAL SUPPORT (OUTPATIENT)
Dept: DENTISTRY | Facility: CLINIC | Age: 46
End: 2021-10-07

## 2021-10-07 VITALS — SYSTOLIC BLOOD PRESSURE: 117 MMHG | DIASTOLIC BLOOD PRESSURE: 74 MMHG | TEMPERATURE: 97.1 F | HEART RATE: 67 BPM

## 2021-10-07 DIAGNOSIS — Z01.20 ENCOUNTER FOR DENTAL EXAMINATION: Primary | ICD-10-CM

## 2021-10-07 DIAGNOSIS — Z01.21 ENCOUNTER FOR DENTAL EXAMINATION AND CLEANING WITH ABNORMAL FINDINGS: ICD-10-CM

## 2021-10-07 PROCEDURE — D0120 PERIODIC ORAL EVALUATION - ESTABLISHED PATIENT: HCPCS | Performed by: DENTIST

## 2021-10-07 PROCEDURE — D0210 INTRAORAL - COMPLETE SERIES OF RADIOGRAPHIC IMAGES: HCPCS | Performed by: DENTAL HYGIENIST

## 2021-10-08 ENCOUNTER — CLINICAL SUPPORT (OUTPATIENT)
Dept: DENTISTRY | Facility: CLINIC | Age: 46
End: 2021-10-08

## 2021-10-08 VITALS — DIASTOLIC BLOOD PRESSURE: 79 MMHG | SYSTOLIC BLOOD PRESSURE: 125 MMHG | HEART RATE: 63 BPM | TEMPERATURE: 96.4 F

## 2021-10-08 DIAGNOSIS — Z01.21 ENCOUNTER FOR DENTAL EXAMINATION AND CLEANING WITH ABNORMAL FINDINGS: Primary | ICD-10-CM

## 2021-10-08 PROCEDURE — D1330 ORAL HYGIENE INSTRUCTIONS: HCPCS | Performed by: DENTIST

## 2021-10-08 PROCEDURE — D1206 TOPICAL APPLICATION OF FLUORIDE VARNISH: HCPCS | Performed by: DENTIST

## 2021-10-08 PROCEDURE — D1110 PROPHYLAXIS - ADULT: HCPCS | Performed by: DENTIST

## 2022-01-13 DIAGNOSIS — E03.9 HYPOTHYROIDISM, UNSPECIFIED TYPE: ICD-10-CM

## 2022-01-13 RX ORDER — LEVOTHYROXINE SODIUM 112 UG/1
112 TABLET ORAL
Qty: 90 TABLET | Refills: 1 | Status: SHIPPED | OUTPATIENT
Start: 2022-01-13

## 2023-04-07 ENCOUNTER — APPOINTMENT (OUTPATIENT)
Dept: LAB | Facility: CLINIC | Age: 48
End: 2023-04-07

## 2023-04-07 DIAGNOSIS — M79.10 MYALGIA, UNSPECIFIED SITE: ICD-10-CM

## 2023-04-07 DIAGNOSIS — E03.8 OTHER SPECIFIED HYPOTHYROIDISM: ICD-10-CM

## 2023-04-07 LAB
ALBUMIN SERPL BCP-MCNC: 3.7 G/DL (ref 3.5–5)
ALP SERPL-CCNC: 48 U/L (ref 46–116)
ALT SERPL W P-5'-P-CCNC: 15 U/L (ref 12–78)
ANION GAP SERPL CALCULATED.3IONS-SCNC: 4 MMOL/L (ref 4–13)
AST SERPL W P-5'-P-CCNC: 11 U/L (ref 5–45)
BASOPHILS # BLD AUTO: 0.04 THOUSANDS/ÂΜL (ref 0–0.1)
BASOPHILS NFR BLD AUTO: 1 % (ref 0–1)
BILIRUB SERPL-MCNC: 0.7 MG/DL (ref 0.2–1)
BUN SERPL-MCNC: 7 MG/DL (ref 5–25)
CALCIUM SERPL-MCNC: 8.8 MG/DL (ref 8.3–10.1)
CHLORIDE SERPL-SCNC: 107 MMOL/L (ref 96–108)
CO2 SERPL-SCNC: 25 MMOL/L (ref 21–32)
CREAT SERPL-MCNC: 0.6 MG/DL (ref 0.6–1.3)
EOSINOPHIL # BLD AUTO: 0.05 THOUSAND/ÂΜL (ref 0–0.61)
EOSINOPHIL NFR BLD AUTO: 2 % (ref 0–6)
ERYTHROCYTE [DISTWIDTH] IN BLOOD BY AUTOMATED COUNT: 19.6 % (ref 11.6–15.1)
GFR SERPL CREATININE-BSD FRML MDRD: 107 ML/MIN/1.73SQ M
GLUCOSE P FAST SERPL-MCNC: 81 MG/DL (ref 65–99)
HCT VFR BLD AUTO: 26.7 % (ref 34.8–46.1)
HGB BLD-MCNC: 7.5 G/DL (ref 11.5–15.4)
IMM GRANULOCYTES # BLD AUTO: 0.01 THOUSAND/UL (ref 0–0.2)
IMM GRANULOCYTES NFR BLD AUTO: 0 % (ref 0–2)
LYMPHOCYTES # BLD AUTO: 1.34 THOUSANDS/ÂΜL (ref 0.6–4.47)
LYMPHOCYTES NFR BLD AUTO: 41 % (ref 14–44)
MCH RBC QN AUTO: 18.9 PG (ref 26.8–34.3)
MCHC RBC AUTO-ENTMCNC: 28.1 G/DL (ref 31.4–37.4)
MCV RBC AUTO: 67 FL (ref 82–98)
MONOCYTES # BLD AUTO: 0.44 THOUSAND/ÂΜL (ref 0.17–1.22)
MONOCYTES NFR BLD AUTO: 13 % (ref 4–12)
NEUTROPHILS # BLD AUTO: 1.42 THOUSANDS/ÂΜL (ref 1.85–7.62)
NEUTS SEG NFR BLD AUTO: 43 % (ref 43–75)
NRBC BLD AUTO-RTO: 0 /100 WBCS
PLATELET # BLD AUTO: 318 THOUSANDS/UL (ref 149–390)
PMV BLD AUTO: 10.3 FL (ref 8.9–12.7)
POTASSIUM SERPL-SCNC: 4.1 MMOL/L (ref 3.5–5.3)
PROT SERPL-MCNC: 7.3 G/DL (ref 6.4–8.4)
RBC # BLD AUTO: 3.97 MILLION/UL (ref 3.81–5.12)
SODIUM SERPL-SCNC: 136 MMOL/L (ref 135–147)
TSH SERPL DL<=0.05 MIU/L-ACNC: 12.2 UIU/ML (ref 0.45–4.5)
WBC # BLD AUTO: 3.3 THOUSAND/UL (ref 4.31–10.16)

## 2023-04-20 ENCOUNTER — OFFICE VISIT (OUTPATIENT)
Dept: FAMILY MEDICINE CLINIC | Facility: CLINIC | Age: 48
End: 2023-04-20

## 2023-04-20 VITALS
RESPIRATION RATE: 16 BRPM | DIASTOLIC BLOOD PRESSURE: 78 MMHG | OXYGEN SATURATION: 99 % | TEMPERATURE: 97.9 F | HEART RATE: 84 BPM | WEIGHT: 173.8 LBS | SYSTOLIC BLOOD PRESSURE: 102 MMHG | HEIGHT: 71 IN | BODY MASS INDEX: 24.33 KG/M2

## 2023-04-20 DIAGNOSIS — D50.8 OTHER IRON DEFICIENCY ANEMIAS: ICD-10-CM

## 2023-04-20 DIAGNOSIS — E03.9 HYPOTHYROIDISM, UNSPECIFIED TYPE: ICD-10-CM

## 2023-04-20 DIAGNOSIS — D50.0 IRON DEFICIENCY ANEMIA DUE TO CHRONIC BLOOD LOSS: Primary | ICD-10-CM

## 2023-04-20 RX ORDER — SODIUM CHLORIDE 9 MG/ML
20 INJECTION, SOLUTION INTRAVENOUS ONCE
Status: CANCELLED | OUTPATIENT
Start: 2023-04-21

## 2023-04-20 RX ORDER — LEVOTHYROXINE SODIUM 112 UG/1
112 TABLET ORAL
Qty: 90 TABLET | Refills: 1 | Status: SHIPPED | OUTPATIENT
Start: 2023-04-20

## 2023-04-20 NOTE — PROGRESS NOTES
Name: Dean Pollard      : 1975      MRN: [id number]  Encounter Provider: Denise Amor MD  Encounter Date: 2023   Encounter department: [address] [place] [address]     1  Iron deficiency anemia due to chronic blood loss  Assessment & Plan:  Assessment   Most recent Heme @7 5, anemic, symptomatic(fatigue with exertion), hemodynamically stable  Reports vaginal bleeding(spotting)  On ferrous sulfate 325 mg daily and vit c   Hx of no compliance   Also Hx of BetaThalassemia minor        Plan  D/C Iron, start Venofer 20 mg INJ, scheduled for: , , , , ,   Will order Iron panel, CBC   Patient educated on the importance of office visit compliance  Will order CBC and CMP and complete transvaginal US  F/U in 1 weeks for further evaluation or sooner if needed  ED precautions for other symptoms like lightheaded, diaphoresis, bleeding, chest pain, palpitation or active bleeding  Orders:  -     CBC and differential; Future  -     Iron Panel (Includes Ferritin, Iron Sat%, Iron, and TIBC); Future  -     Ambulatory Referral to Gynecology; Future  -     US pelvis complete w transvaginal; Future; Expected date: 2023    2  Hypothyroidism, unspecified type  Assessment & Plan:  Assessment  TSH 11 4 on  Suboptimally controlled  Currently on levothyroxine 112 mcg    Plan  -Continue continue treatment    -Will order TSH  -Reassess medication once result are available  Orders:  -     levothyroxine 112 mcg tablet; Take 1 tablet (112 mcg total) by mouth daily in the early morning    3  Other iron deficiency anemias         Subjective      Source of information: patient, patient not well know to me    Information obtained in Danish  It was a pleasure to [address] is a 50 y o   female with PMH significant for hx of anemia, menorrhagia, iron deficiency anemia, beta thalassemia minor and elevated ttg, who presents for follow up of her anemia and management of her chronic conditions  Today's main complain: 1  Lab results  Patient's medical conditions are stable unless noted otherwise above   Patient has not had any recent hospitalizations, or medical emergencies since last visit  Patient reports compliance with her medical management including medications, last seen more than a year ago  Patient has no further complaints other than what is mentioned in the ROS  Denies tobacco, alcohol and illicit drug consumption  Anemia  Presents for follow-up visit  Symptoms include malaise/fatigue and pallor  There has been no abdominal pain, anorexia, bruising/bleeding easily, confusion, fever, leg swelling, light-headedness, palpitations, paresthesias, pica or weight loss  Signs of blood loss that are present include vaginal bleeding (reports spotting)  Signs of blood loss that are not present include hematemesis, hematochezia and melena  Compliance problems include medication cost (patient was unisured, obtained health insurance one month ago  )  Review of Systems   Constitutional: Positive for malaise/fatigue  Negative for activity change, appetite change, chills, fatigue, fever and weight loss  HENT: Negative for congestion, postnasal drip, rhinorrhea, sneezing and sore throat  Eyes: Negative for visual disturbance  Respiratory: Negative for cough, chest tightness, shortness of breath and wheezing  Cardiovascular: Negative for chest pain, palpitations and leg swelling  Gastrointestinal: Negative for abdominal distention, abdominal pain, anorexia, blood in stool, constipation, diarrhea, hematemesis, hematochezia, melena, nausea and vomiting  Genitourinary: Positive for vaginal bleeding (reports spotting)  Negative for difficulty urinating, dysuria, hematuria, menstrual problem and vaginal discharge  Musculoskeletal: Negative for arthralgias and myalgias  Skin: Positive for pallor  Negative for rash  "  Neurological: Negative for dizziness, syncope, weakness, light-headedness, numbness, headaches and paresthesias  Hematological: Does not bruise/bleed easily  Psychiatric/Behavioral: Negative for agitation, behavioral problems, confusion and suicidal ideas         Current Outpatient Medications on File Prior to Visit   Medication Sig   • acetaminophen (TYLENOL) 325 mg tablet Take 2 tablets (650 mg total) by mouth every 6 (six) hours as needed for mild pain   • albuterol (PROVENTIL HFA,VENTOLIN HFA) 90 mcg/act inhaler Inhale 2 puffs every 6 (six) hours as needed for wheezing or shortness of breath   • ascorbic acid (VITAMIN C) 500 mg tablet Take 1 tablet (500 mg total) by mouth daily   • benzonatate (TESSALON) 200 MG capsule Take 1 capsule (200 mg total) by mouth 3 (three) times a day as needed for cough (Patient not taking: Reported on 1/6/2021)   • guaiFENesin 200 MG tablet Take 2 tablets (400 mg total) by mouth every 4 (four) hours as needed for cough (Patient not taking: Reported on 5/7/2020)   • hydrOXYzine HCL (ATARAX) 50 mg tablet Take 75 mg by mouth once   • ibuprofen (MOTRIN) 600 mg tablet Take 1 tablet (600 mg total) by mouth every 6 (six) hours as needed for mild pain or moderate pain (Patient not taking: Reported on 2/9/2021)   • medroxyPROGESTERone (PROVERA) 10 mg tablet Take 1 tablet (10 mg total) by mouth daily (Patient not taking: Reported on 2/9/2021)   • menthol-cetylpyridinium (CEPACOL) 3 MG lozenge Take 1 lozenge (3 mg total) by mouth as needed for sore throat (Patient not taking: Reported on 5/7/2020)   • Multiple Vitamin (multivitamin) tablet Take 1 tablet by mouth daily   • naproxen (NAPROSYN) 500 mg tablet Take 1 tablet (500 mg total) by mouth 2 (two) times a day with meals (Patient not taking: Reported on 2/9/2021)       Objective     /78 (BP Location: Left arm, Patient Position: Sitting, Cuff Size: Standard)   Pulse 84   Temp 97 9 °F (36 6 °C) (Temporal)   Resp 16   Ht 5' 11\" " (1 803 m)   Wt 78 8 kg (173 lb 12 8 oz)   LMP 04/06/2023 (Exact Date) Comment: currently on it  SpO2 99%   BMI 24 24 kg/m²     Physical Exam  Vitals and nursing note reviewed  Constitutional:       General: She is awake  She is not in acute distress  Appearance: Normal appearance  She is well-developed, well-groomed and normal weight  She is not ill-appearing, toxic-appearing or diaphoretic  HENT:      Head: Normocephalic and atraumatic  Nose: Nose normal       Mouth/Throat:      Mouth: Mucous membranes are moist       Pharynx: Oropharynx is clear  No oropharyngeal exudate or posterior oropharyngeal erythema  Eyes:      Extraocular Movements: Extraocular movements intact  Conjunctiva/sclera: Conjunctivae normal       Pupils: Pupils are equal, round, and reactive to light  Cardiovascular:      Rate and Rhythm: Normal rate and regular rhythm  No extrasystoles are present  Pulses: Normal pulses  Radial pulses are 2+ on the right side and 2+ on the left side  Heart sounds: Normal heart sounds, S1 normal and S2 normal    Pulmonary:      Effort: Pulmonary effort is normal       Breath sounds: Normal breath sounds and air entry  Abdominal:      General: Bowel sounds are normal       Palpations: Abdomen is soft  There is no mass  Tenderness: There is no abdominal tenderness  There is no right CVA tenderness, left CVA tenderness or guarding  Musculoskeletal:         General: Normal range of motion  Cervical back: Normal range of motion  Right lower leg: No edema  Left lower leg: No edema  Skin:     General: Skin is warm  Capillary Refill: Capillary refill takes less than 2 seconds  Coloration: Skin is pale  Skin is not jaundiced  Findings: No bruising, erythema, lesion or rash  Neurological:      General: No focal deficit present  Mental Status: She is alert and oriented to person, place, and time  Mental status is at baseline  GCS: GCS eye subscore is 4  GCS verbal subscore is 5  GCS motor subscore is 6  Psychiatric:         Attention and Perception: Attention and perception normal          Mood and Affect: Mood and affect normal          Speech: Speech normal          Behavior: Behavior normal  Behavior is cooperative  Thought Content:  Thought content normal          Cognition and Memory: Cognition normal          Judgment: Judgment normal        Elizabeth Marino MD

## 2023-04-24 ENCOUNTER — HOSPITAL ENCOUNTER (OUTPATIENT)
Dept: ULTRASOUND IMAGING | Facility: HOSPITAL | Age: 48
Discharge: HOME | End: 2023-04-24

## 2023-04-24 ENCOUNTER — LAB (OUTPATIENT)
Dept: LAB | Facility: HOSPITAL | Age: 48
End: 2023-04-24

## 2023-04-24 DIAGNOSIS — D50.0 IRON DEFICIENCY ANEMIA DUE TO CHRONIC BLOOD LOSS: ICD-10-CM

## 2023-04-24 LAB
BASOPHILS # BLD AUTO: 0.03 THOUSANDS/ΜL (ref 0–0.1)
BASOPHILS NFR BLD AUTO: 1 % (ref 0–1)
EOSINOPHIL # BLD AUTO: 0.04 THOUSAND/ΜL (ref 0–0.61)
EOSINOPHIL NFR BLD AUTO: 1 % (ref 0–6)
ERYTHROCYTE [DISTWIDTH] IN BLOOD BY AUTOMATED COUNT: 19.3 % (ref 11.6–15.1)
HCT VFR BLD AUTO: 25.9 % (ref 34.8–46.1)
HGB BLD-MCNC: 7.2 G/DL (ref 11.5–15.4)
IMM GRANULOCYTES # BLD AUTO: 0.01 THOUSAND/UL (ref 0–0.2)
IMM GRANULOCYTES NFR BLD AUTO: 0 % (ref 0–2)
LYMPHOCYTES # BLD AUTO: 1.56 THOUSANDS/ΜL (ref 0.6–4.47)
LYMPHOCYTES NFR BLD AUTO: 40 % (ref 14–44)
MCH RBC QN AUTO: 18.4 PG (ref 26.8–34.3)
MCHC RBC AUTO-ENTMCNC: 27.8 G/DL (ref 31.4–37.4)
MCV RBC AUTO: 66 FL (ref 82–98)
MONOCYTES # BLD AUTO: 0.37 THOUSAND/ΜL (ref 0.17–1.22)
MONOCYTES NFR BLD AUTO: 9 % (ref 4–12)
NEUTROPHILS # BLD AUTO: 1.94 THOUSANDS/ΜL (ref 1.85–7.62)
NEUTS SEG NFR BLD AUTO: 49 % (ref 43–75)
NRBC BLD AUTO-RTO: 0 /100 WBCS
PLATELET # BLD AUTO: 325 THOUSANDS/UL (ref 149–390)
PMV BLD AUTO: 9.7 FL (ref 8.9–12.7)
RBC # BLD AUTO: 3.91 MILLION/UL (ref 3.81–5.12)
WBC # BLD AUTO: 3.95 THOUSAND/UL (ref 4.31–10.16)

## 2023-04-25 DIAGNOSIS — D50.8 OTHER IRON DEFICIENCY ANEMIA: Primary | ICD-10-CM

## 2023-04-25 LAB
FERRITIN SERPL-MCNC: 3 NG/ML (ref 8–388)
IRON SATN MFR SERPL: 3 % (ref 15–50)
IRON SERPL-MCNC: 12 UG/DL (ref 50–170)
TIBC SERPL-MCNC: 356 UG/DL (ref 250–450)

## 2023-04-25 NOTE — RESULT ENCOUNTER NOTE
Patient was restarted at her last visit, since was not compliant with her Levothyroxine  Will repeat TSH levels in 2 months   Microcytic Anemia has slightly decreased from 7 5 to 7 2 from most recent CBC(on 4/24/2023)  Iron infusions ordered at her last visit  Patient Called and informed of this results, CBC ordered to be performed in 1 week Also informed of the importance of her treatment  ED precautions given, and hematology referral ordered  Further discussion our next visit or earlier if needed

## 2023-04-26 ENCOUNTER — TELEPHONE (OUTPATIENT)
Dept: HEMATOLOGY ONCOLOGY | Facility: CLINIC | Age: 48
End: 2023-04-26

## 2023-04-26 ENCOUNTER — CONSULT (OUTPATIENT)
Dept: SURGERY | Facility: CLINIC | Age: 48
End: 2023-04-26

## 2023-04-26 VITALS
BODY MASS INDEX: 24.22 KG/M2 | HEIGHT: 71 IN | DIASTOLIC BLOOD PRESSURE: 76 MMHG | HEART RATE: 72 BPM | TEMPERATURE: 97.9 F | OXYGEN SATURATION: 100 % | WEIGHT: 173 LBS | SYSTOLIC BLOOD PRESSURE: 108 MMHG

## 2023-04-26 DIAGNOSIS — Z12.11 SCREEN FOR COLON CANCER: Primary | ICD-10-CM

## 2023-04-26 RX ORDER — BISACODYL 5 MG/1
5 TABLET, DELAYED RELEASE ORAL SEE ADMIN INSTRUCTIONS
Qty: 4 TABLET | Refills: 0 | Status: SHIPPED | OUTPATIENT
Start: 2023-04-26

## 2023-04-26 RX ORDER — POLYETHYLENE GLYCOL 3350 17 G/17G
238 POWDER, FOR SOLUTION ORAL SEE ADMIN INSTRUCTIONS
Qty: 238 G | Refills: 0 | Status: SHIPPED | OUTPATIENT
Start: 2023-04-26

## 2023-04-26 NOTE — ASSESSMENT & PLAN NOTE
Assessment  TSH 11 4 on 11/20 Suboptimally controlled  Currently on levothyroxine 112 mcg    Plan  -Continue continue treatment    -Will order TSH  -Reassess medication once result are available

## 2023-04-26 NOTE — TELEPHONE ENCOUNTER
Appointment Schedule   Who are you speaking with? Patient   If it is not the patient, are they listed on an active communication consent form? N/A   Which provider is the appointment scheduled with? ZEN Rader   At which location is the appointment scheduled for? [place]   When is the appointment scheduled? Please list date and time 7/3/23 230pm    What is the reason for this appointment? Follow up appointment    Did patient voice understanding of the details of this appointment? Yes   Was the no show policy reviewed with patient?  Yes

## 2023-04-26 NOTE — PROGRESS NOTES
Assessment/Plan:  Discussed risks and benefits of colonoscopy including low risk of bleeding if polypectomy performed, abdominal discomfort/bloating and very small risk of colon perforation  Explained bowel prep in detail and gave instructions detailing appropriate pre-procedure diet and medication use  Prescription for bowel prep will be sent to the pharmacy  Procedure will be scheduled at earliest convenience  No problem-specific Assessment & Plan notes found for this encounter  Diagnoses and all orders for this visit:    Screen for colon cancer  -     Ambulatory Referral to General Surgery  -     polyethylene glycol (GLYCOLAX) 17 GM/SCOOP powder; Take 238 g by mouth see administration instructions Mix 238 g with 64 oz of clear liquid  Drink half starting at noon on the day prior to colonoscopy  Drink remaining half 6 hours prior to procedure on day of colonoscopy  -     bisacodyl (DULCOLAX) 5 mg EC tablet; Take 1 tablet (5 mg total) by mouth see administration instructions Take 2 tablets at 12:00 p m  on day prior to colonoscopy  Take 2 tablets at 4:00 p m  on day prior to colonoscopy          Subjective:      Patient ID: Daniela Saleh is a 50 y o  female  She presents to discuss screening colonoscopy  She has had no prior colon cancer screening  She denies any abdominal pain or rectal pain, no blood in her stool no family history of colon cancer  She does have a history of anemia and is on iron and starting parenteral iron  A chart review was performed and previous primary care visit notes were reviewed  All applicable imaging studies were reviewed and images were reviewed personally  All applicable laboratory studies were reviewed personally  Care everywhere review was performed if  available and all pertinent notes were reviewed      The following portions of the patient's history were reviewed and updated as appropriate: allergies, current medications, past family history, "past medical history, past social history, past surgical history and problem list     Review of Systems   Gastrointestinal: Negative for abdominal distention, abdominal pain, anal bleeding, blood in stool, constipation and rectal pain  All other systems reviewed and are negative  Objective:      /76 (BP Location: Left arm, Patient Position: Sitting, Cuff Size: Standard)   Pulse 72   Temp 97 9 °F (36 6 °C) (Tympanic)   Ht 5' 11\" (1 803 m)   Wt 78 5 kg (173 lb)   LMP 04/06/2023 (Exact Date) Comment: currently on it  SpO2 100%   BMI 24 13 kg/m²          Physical Exam  Vitals reviewed  Constitutional:       General: She is not in acute distress  Appearance: Normal appearance  She is normal weight  HENT:      Head: Normocephalic and atraumatic  Nose: Nose normal       Mouth/Throat:      Mouth: Mucous membranes are moist       Pharynx: Oropharynx is clear  Eyes:      Extraocular Movements: Extraocular movements intact  Conjunctiva/sclera: Conjunctivae normal       Pupils: Pupils are equal, round, and reactive to light  Cardiovascular:      Rate and Rhythm: Normal rate and regular rhythm  Heart sounds: Normal heart sounds  Pulmonary:      Effort: Pulmonary effort is normal  No respiratory distress  Breath sounds: Normal breath sounds  Abdominal:      General: Abdomen is flat  There is no distension  Palpations: Abdomen is soft  Tenderness: There is no abdominal tenderness  Musculoskeletal:         General: No swelling or tenderness  Normal range of motion  Cervical back: Normal range of motion and neck supple  Skin:     General: Skin is warm and dry  Neurological:      General: No focal deficit present  Mental Status: She is alert and oriented to person, place, and time           "

## 2023-04-26 NOTE — ASSESSMENT & PLAN NOTE
Assessment   Most recent Heme @7 5, anemic, symptomatic(fatigue with exertion), hemodynamically stable  Reports vaginal bleeding(spotting)  On ferrous sulfate 325 mg daily and vit c   Hx of no compliance   Also Hx of BetaThalassemia minor        Plan  D/C Iron, start Venofer 20 mg INJ, scheduled for: 4/21, 4/28, 5/5, 5/12, 5/19, 5/26  Will order Iron panel, CBC   Patient educated on the importance of office visit compliance  Will order CBC and CMP and complete transvaginal US  F/U in 1 weeks for further evaluation or sooner if needed  ED precautions for other symptoms like lightheaded, diaphoresis, bleeding, chest pain, palpitation or active bleeding

## 2023-04-26 NOTE — H&P (VIEW-ONLY)
Assessment/Plan:  Discussed risks and benefits of colonoscopy including low risk of bleeding if polypectomy performed, abdominal discomfort/bloating and very small risk of colon perforation  Explained bowel prep in detail and gave instructions detailing appropriate pre-procedure diet and medication use  Prescription for bowel prep will be sent to the pharmacy  Procedure will be scheduled at earliest convenience  No problem-specific Assessment & Plan notes found for this encounter  Diagnoses and all orders for this visit:    Screen for colon cancer  -     Ambulatory Referral to General Surgery  -     polyethylene glycol (GLYCOLAX) 17 GM/SCOOP powder; Take 238 g by mouth see administration instructions Mix 238 g with 64 oz of clear liquid  Drink half starting at noon on the day prior to colonoscopy  Drink remaining half 6 hours prior to procedure on day of colonoscopy  -     bisacodyl (DULCOLAX) 5 mg EC tablet; Take 1 tablet (5 mg total) by mouth see administration instructions Take 2 tablets at 12:00 p m  on day prior to colonoscopy  Take 2 tablets at 4:00 p m  on day prior to colonoscopy          Subjective:      Patient ID: Graeme Kapadia is a 50 y o  female  She presents to discuss screening colonoscopy  She has had no prior colon cancer screening  She denies any abdominal pain or rectal pain, no blood in her stool no family history of colon cancer  She does have a history of anemia and is on iron and starting parenteral iron  A chart review was performed and previous primary care visit notes were reviewed  All applicable imaging studies were reviewed and images were reviewed personally  All applicable laboratory studies were reviewed personally  Care everywhere review was performed if  available and all pertinent notes were reviewed      The following portions of the patient's history were reviewed and updated as appropriate: allergies, current medications, past family history, "past medical history, past social history, past surgical history and problem list     Review of Systems   Gastrointestinal: Negative for abdominal distention, abdominal pain, anal bleeding, blood in stool, constipation and rectal pain  All other systems reviewed and are negative  Objective:      /76 (BP Location: Left arm, Patient Position: Sitting, Cuff Size: Standard)   Pulse 72   Temp 97 9 °F (36 6 °C) (Tympanic)   Ht 5' 11\" (1 803 m)   Wt 78 5 kg (173 lb)   LMP 04/06/2023 (Exact Date) Comment: currently on it  SpO2 100%   BMI 24 13 kg/m²          Physical Exam  Vitals reviewed  Constitutional:       General: She is not in acute distress  Appearance: Normal appearance  She is normal weight  HENT:      Head: Normocephalic and atraumatic  Nose: Nose normal       Mouth/Throat:      Mouth: Mucous membranes are moist       Pharynx: Oropharynx is clear  Eyes:      Extraocular Movements: Extraocular movements intact  Conjunctiva/sclera: Conjunctivae normal       Pupils: Pupils are equal, round, and reactive to light  Cardiovascular:      Rate and Rhythm: Normal rate and regular rhythm  Heart sounds: Normal heart sounds  Pulmonary:      Effort: Pulmonary effort is normal  No respiratory distress  Breath sounds: Normal breath sounds  Abdominal:      General: Abdomen is flat  There is no distension  Palpations: Abdomen is soft  Tenderness: There is no abdominal tenderness  Musculoskeletal:         General: No swelling or tenderness  Normal range of motion  Cervical back: Normal range of motion and neck supple  Skin:     General: Skin is warm and dry  Neurological:      General: No focal deficit present  Mental Status: She is alert and oriented to person, place, and time           "

## 2023-05-04 ENCOUNTER — HOSPITAL ENCOUNTER (OUTPATIENT)
Dept: INFUSION CENTER | Facility: HOSPITAL | Age: 48
End: 2023-05-04

## 2023-05-04 ENCOUNTER — HOSPITAL ENCOUNTER (OUTPATIENT)
Dept: INFUSION CENTER | Facility: HOSPITAL | Age: 48
Discharge: HOME | End: 2023-05-04

## 2023-05-04 VITALS
HEART RATE: 84 BPM | TEMPERATURE: 97.9 F | SYSTOLIC BLOOD PRESSURE: 105 MMHG | DIASTOLIC BLOOD PRESSURE: 69 MMHG | RESPIRATION RATE: 16 BRPM

## 2023-05-04 DIAGNOSIS — D50.8 OTHER IRON DEFICIENCY ANEMIAS: Primary | ICD-10-CM

## 2023-05-04 RX ORDER — SODIUM CHLORIDE 9 MG/ML
20 INJECTION, SOLUTION INTRAVENOUS ONCE
Status: CANCELLED | OUTPATIENT
Start: 2023-05-04

## 2023-05-04 RX ORDER — SODIUM CHLORIDE 9 MG/ML
20 INJECTION, SOLUTION INTRAVENOUS ONCE
Status: CANCELLED | OUTPATIENT
Start: 2023-05-11

## 2023-05-04 RX ORDER — SODIUM CHLORIDE 9 MG/ML
20 INJECTION, SOLUTION INTRAVENOUS ONCE
Status: COMPLETED | OUTPATIENT
Start: 2023-05-04 | End: 2023-05-04

## 2023-05-04 RX ADMIN — IRON SUCROSE 100 MG: 20 INJECTION, SOLUTION INTRAVENOUS at 14:24

## 2023-05-04 RX ADMIN — SODIUM CHLORIDE 20 ML/HR: 0.9 INJECTION, SOLUTION INTRAVENOUS at 14:15

## 2023-05-04 NOTE — PROGRESS NOTES
Multiple attempts made to reach Dr Rhona Ernandez to correct beacon orders with no response, pt cx for today, office and provider aware

## 2023-05-08 ENCOUNTER — HOSPITAL ENCOUNTER (OUTPATIENT)
Dept: GASTROENTEROLOGY | Facility: HOSPITAL | Age: 48
Setting detail: OUTPATIENT SURGERY
Discharge: HOME | End: 2023-05-08
Attending: SURGERY | Admitting: SURGERY

## 2023-05-08 ENCOUNTER — ANESTHESIA (OUTPATIENT)
Dept: GASTROENTEROLOGY | Facility: HOSPITAL | Age: 48
End: 2023-05-08

## 2023-05-08 ENCOUNTER — ANESTHESIA EVENT (OUTPATIENT)
Dept: GASTROENTEROLOGY | Facility: HOSPITAL | Age: 48
End: 2023-05-08

## 2023-05-08 VITALS
RESPIRATION RATE: 18 BRPM | TEMPERATURE: 97 F | DIASTOLIC BLOOD PRESSURE: 74 MMHG | BODY MASS INDEX: 24.22 KG/M2 | HEART RATE: 60 BPM | OXYGEN SATURATION: 100 % | WEIGHT: 173 LBS | SYSTOLIC BLOOD PRESSURE: 116 MMHG | HEIGHT: 71 IN

## 2023-05-08 DIAGNOSIS — Z12.11 SCREEN FOR COLON CANCER: ICD-10-CM

## 2023-05-08 LAB
EXT PREGNANCY TEST URINE: NEGATIVE
EXT. CONTROL: NORMAL

## 2023-05-08 RX ORDER — LIDOCAINE HYDROCHLORIDE 10 MG/ML
INJECTION, SOLUTION EPIDURAL; INFILTRATION; INTRACAUDAL; PERINEURAL AS NEEDED
Status: DISCONTINUED | OUTPATIENT
Start: 2023-05-08 | End: 2023-05-08

## 2023-05-08 RX ORDER — PROPOFOL 10 MG/ML
INJECTION, EMULSION INTRAVENOUS AS NEEDED
Status: DISCONTINUED | OUTPATIENT
Start: 2023-05-08 | End: 2023-05-08

## 2023-05-08 RX ORDER — PROPOFOL 10 MG/ML
INJECTION, EMULSION INTRAVENOUS CONTINUOUS PRN
Status: DISCONTINUED | OUTPATIENT
Start: 2023-05-08 | End: 2023-05-08

## 2023-05-08 RX ORDER — SODIUM CHLORIDE, SODIUM LACTATE, POTASSIUM CHLORIDE, CALCIUM CHLORIDE 600; 310; 30; 20 MG/100ML; MG/100ML; MG/100ML; MG/100ML
INJECTION, SOLUTION INTRAVENOUS CONTINUOUS PRN
Status: DISCONTINUED | OUTPATIENT
Start: 2023-05-08 | End: 2023-05-08

## 2023-05-08 RX ADMIN — SODIUM CHLORIDE, SODIUM LACTATE, POTASSIUM CHLORIDE, AND CALCIUM CHLORIDE: .6; .31; .03; .02 INJECTION, SOLUTION INTRAVENOUS at 09:50

## 2023-05-08 RX ADMIN — PROPOFOL 30 MG: 10 INJECTION, EMULSION INTRAVENOUS at 09:59

## 2023-05-08 RX ADMIN — PROPOFOL 100 MCG/KG/MIN: 10 INJECTION, EMULSION INTRAVENOUS at 09:55

## 2023-05-08 RX ADMIN — PROPOFOL 50 MG: 10 INJECTION, EMULSION INTRAVENOUS at 09:55

## 2023-05-08 RX ADMIN — LIDOCAINE HYDROCHLORIDE 25 MG: 10 INJECTION, SOLUTION EPIDURAL; INFILTRATION; INTRACAUDAL; PERINEURAL at 09:54

## 2023-05-08 RX ADMIN — PROPOFOL 20 MG: 10 INJECTION, EMULSION INTRAVENOUS at 10:02

## 2023-05-08 NOTE — ANESTHESIA PREPROCEDURE EVALUATION
Procedure:  COLONOSCOPY    Relevant Problems   ENDO   (+) Hypothyroidism      HEMATOLOGY   (+) Anemia   (+) Beta thalassemia minor   (+) Other iron deficiency anemias      Hb 7 2     Latest Reference Range & Units 05/08/23 07:32   PREGNANCY TEST URINE Negative  Negative     Physical Exam    Airway    Mallampati score: II  TM Distance: >3 FB  Neck ROM: full     Dental   No notable dental hx     Cardiovascular      Pulmonary      Other Findings        Anesthesia Plan  ASA Score- 2     Anesthesia Type- IV sedation with anesthesia with ASA Monitors  Additional Monitors:   Airway Plan:           Plan Factors-Exercise tolerance (METS): >4 METS  Chart reviewed  Existing labs reviewed  Patient summary reviewed  Patient is not a current smoker  Patient did not smoke on day of surgery  Induction- intravenous  Postoperative Plan-     Informed Consent- Anesthetic plan and risks discussed with patient  I personally reviewed this patient with the CRNA  Discussed and agreed on the Anesthesia Plan with the CRNA  Janina Lloyd

## 2023-05-08 NOTE — INTERVAL H&P NOTE
H&P reviewed  After examining the patient I find no changes in the patients condition since the H&P had been written      Vitals:    05/08/23 0721   BP: 113/68   Pulse: 95   Resp: 18   Temp: (!) 97 °F (36 1 °C)   SpO2: 100%

## 2023-05-08 NOTE — ANESTHESIA POSTPROCEDURE EVALUATION
Post-Op Assessment Note    CV Status:  Stable  Pain Score: 0    Pain management: adequate     Mental Status:  Alert and awake   Hydration Status:  Stable   PONV Controlled:  None   Airway Patency:  Patent      Post Op Vitals Reviewed: Yes      Staff: CRNA         No notable events documented      /64 (05/08/23 1019)    Temp (!) 97 °F (36 1 °C) (05/08/23 1019)    Pulse 63 (05/08/23 1019)   Resp 18 (05/08/23 1019)    SpO2 100 % (05/08/23 1019)

## 2023-05-10 NOTE — RESULT ENCOUNTER NOTE
Good evening, please inform patient that her colonoscopy was WNL per EMR review and procedure report  Please inform patient that her next colonoscopy will be in 10 yrs  Thank you and nice evening

## 2023-05-11 ENCOUNTER — HOSPITAL ENCOUNTER (OUTPATIENT)
Dept: INFUSION CENTER | Facility: HOSPITAL | Age: 48
Discharge: HOME | End: 2023-05-11

## 2023-05-11 VITALS
OXYGEN SATURATION: 100 % | SYSTOLIC BLOOD PRESSURE: 105 MMHG | DIASTOLIC BLOOD PRESSURE: 66 MMHG | RESPIRATION RATE: 18 BRPM | TEMPERATURE: 99 F | HEART RATE: 67 BPM

## 2023-05-11 DIAGNOSIS — D50.8 OTHER IRON DEFICIENCY ANEMIAS: Primary | ICD-10-CM

## 2023-05-11 RX ORDER — SODIUM CHLORIDE 9 MG/ML
20 INJECTION, SOLUTION INTRAVENOUS ONCE
Status: CANCELLED | OUTPATIENT
Start: 2023-05-18

## 2023-05-11 RX ORDER — SODIUM CHLORIDE 9 MG/ML
20 INJECTION, SOLUTION INTRAVENOUS ONCE
Status: COMPLETED | OUTPATIENT
Start: 2023-05-11 | End: 2023-05-11

## 2023-05-11 RX ADMIN — SODIUM CHLORIDE 20 ML/HR: 0.9 INJECTION, SOLUTION INTRAVENOUS at 14:10

## 2023-05-11 RX ADMIN — IRON SUCROSE 100 MG: 20 INJECTION, SOLUTION INTRAVENOUS at 14:16

## 2023-05-11 NOTE — PROGRESS NOTES
Pt tolerated venofer infusion without difficulty  No s/s reaction noted  Next appt confirmed  AVS declined  Left ambulatory in stable condition

## 2023-05-19 ENCOUNTER — HOSPITAL ENCOUNTER (OUTPATIENT)
Dept: INFUSION CENTER | Facility: HOSPITAL | Age: 48
End: 2023-05-19

## 2023-05-19 VITALS
HEART RATE: 86 BPM | DIASTOLIC BLOOD PRESSURE: 63 MMHG | TEMPERATURE: 98.8 F | SYSTOLIC BLOOD PRESSURE: 103 MMHG | RESPIRATION RATE: 18 BRPM | OXYGEN SATURATION: 100 %

## 2023-05-19 DIAGNOSIS — D50.8 OTHER IRON DEFICIENCY ANEMIAS: Primary | ICD-10-CM

## 2023-05-19 RX ORDER — SODIUM CHLORIDE 9 MG/ML
20 INJECTION, SOLUTION INTRAVENOUS ONCE
Status: CANCELLED | OUTPATIENT
Start: 2023-05-25

## 2023-05-19 RX ORDER — SODIUM CHLORIDE 9 MG/ML
20 INJECTION, SOLUTION INTRAVENOUS ONCE
Status: COMPLETED | OUTPATIENT
Start: 2023-05-19 | End: 2023-05-19

## 2023-05-19 RX ADMIN — IRON SUCROSE 100 MG: 20 INJECTION, SOLUTION INTRAVENOUS at 14:58

## 2023-05-19 RX ADMIN — SODIUM CHLORIDE 20 ML/HR: 0.9 INJECTION, SOLUTION INTRAVENOUS at 14:57

## 2023-05-24 ENCOUNTER — OFFICE VISIT (OUTPATIENT)
Dept: OBGYN CLINIC | Facility: CLINIC | Age: 48
End: 2023-05-24

## 2023-05-24 VITALS
BODY MASS INDEX: 24.42 KG/M2 | DIASTOLIC BLOOD PRESSURE: 65 MMHG | WEIGHT: 174.4 LBS | HEIGHT: 71 IN | SYSTOLIC BLOOD PRESSURE: 109 MMHG | HEART RATE: 66 BPM

## 2023-05-24 DIAGNOSIS — D50.0 IRON DEFICIENCY ANEMIA DUE TO CHRONIC BLOOD LOSS: ICD-10-CM

## 2023-05-24 DIAGNOSIS — D25.9 UTERINE LEIOMYOMA, UNSPECIFIED LOCATION: ICD-10-CM

## 2023-05-24 DIAGNOSIS — N93.9 ABNORMAL UTERINE BLEEDING (AUB): Primary | ICD-10-CM

## 2023-05-24 NOTE — PROGRESS NOTES
[address] [address]  Phone#  [phone]  Fax#  [phone]  Seble Pagan       PROBLEM VISIT      Subjective     Seble Pagan is a 50 y o  female here for  AUB/pelvic pain/fibroid uterus  Patient states abnormal uterine bleeding, currently on Venofer infusions last hemoglobin of 7 5 last 4/7/2023  Patientis experiencing regular menstrual periods lasting 2 weeks  First days of her menses are very heavy requiring use of diapers  Patient is using 4-5 diapers per day and her heavy days  In addition patient states heaviness sensation and constant pelvic pain  In 2021 surgical approach was discussed with patient but at that time she was unsure about proceed with it  Progesterone formulations were ordered for regulation of her menstrual periods but patient never restarted due to regulation/improvement of her menstrual period  Endometrial biopsy 2021: Benign    Last ultrasound 4/24/2023  FINDINGS:     UTERUS:  The uterus is anteverted in position, measuring 11 7 x 4 4 x 7 3 cm  The uterus has heterogeneous echotexture  Fibroid 1: 2 8 x 2 8 x 1 7 cm  Right anterior exophytic location  Increased in size, previously 1 1 x 1 2 x 1 2 cm  Fibroid 2: 1 7 x 1 6 x 1 3 cm  Left lateral exophytic location  Fibroid 3: 1 6 x 1 6 x 0 9 cm  Left anterior subserosal location  Fibroid 4: 2 7 x 2 4 x 1 8 cm  Left posterior exophytic location  Not significantly changed  The cervix appears within normal limits      ENDOMETRIUM:  The endometrial echo complex has an AP caliber of 8 0 mm  Its appearance is within normal limits for age and cycle and shows no filling defects        OVARIES/ADNEXA:  Right ovary:  2 2 x 2 2 x 1 7 cm  4 3 mL  Left ovary: Not visualized  Right ovarian Doppler flow is within normal limits    No suspicious ovarian or adnexal abnormality      OTHER:  No free fluid or loculated fluid collections         IMPRESSION:     Multiple "exophytic uterine fibroids, at least 1 of which has increased in size    Surgical history significant for 2 previous  sections, no other intra abdominal surgeries    Personal health questionnaire reviewed: yes  Gynecologic History  Patient's last menstrual period was 2023  Contraception: none  Last Pap: 2019  Results were: normal cotesting       Obstetric History  OB History    Para Term  AB Living   5 2 2 0 3 2   SAB IAB Ectopic Multiple Live Births   2 1 0 0 2      # Outcome Date GA Lbr Graeme/2nd Weight Sex Delivery Anes PTL Lv   5 SAB            4 SAB            3 IAB            2 Term            1 Term                  The following portions of the patient's history were reviewed and updated as appropriate: allergies, current medications, past family history and problem list     Review of Systems  Pertinent items are noted in HPI        Objective     /65   Pulse 66   Ht 5' 11\" (1 803 m)   Wt 79 1 kg (174 lb 6 4 oz)   LMP 2023   BMI 24 32 kg/m²     General Appearance:    Alert, cooperative, no distress, appears stated age   Head:    Normocephalic, without obvious abnormality, atraumatic   Eyes:    PERRL, conjunctiva/corneas clear, EOM's intact, fundi     benign, both eyes   Ears:    Normal TM's and external ear canals, both ears   Nose:   Nares normal, septum midline, mucosa normal, no drainage    or sinus tenderness   Throat:   Lips, mucosa, and tongue normal; teeth and gums normal   Neck:   Supple, symmetrical, trachea midline, no adenopathy;     thyroid:  no enlargement/tenderness/nodules; no carotid    bruit or JVD   Back:     Symmetric, no curvature, ROM normal, no CVA tenderness   Lungs:     Clear to auscultation bilaterally, respirations unlabored   Chest Wall:    No tenderness or deformity    Heart:    Regular rate and rhythm, S1 and S2 normal, no murmur, rub   or gallop       Abdomen:     Soft, non-tender, bowel sounds active all four quadrants,     no " masses, no organomegaly   Genitalia:    Normal female without lesion, discharge or tenderness, enlarged uterus approximately 13 cm in size, narrow vaginal introitus        Extremities:   Extremities normal, atraumatic, no cyanosis or edema   Pulses:   2+ and symmetric all extremities   Skin:   Skin color, texture, turgor normal, no rashes or lesions   Lymph nodes:   Cervical, supraclavicular, and axillary nodes normal   Neurologic:   CNII-XII intact, normal strength, sensation and reflexes     throughout         Assessment/Plan      AUB in setting of fibroid uterus  • Last endometrial biopsy in  benign, new specimen recommended  • Last Pap smear in 2019 benign (cotesting)  • Last ultrasound 2023 uterus measuring 11 7 x 4 4 x 7 3 cm  multiple fibroids present  • Last TSH elevated 23, patient dose was increased by her PCP after those results  • Hemoglobin 7 5 we will continue iron infusions  · The results of the work-up for abnormal uterine bleeding were reviewed today  Treatment options were discussed, including expectant management, hormonal (both combination and progesterone-only), Mirena, Lysteda, endometrial ablation, and hysterectomy  The risks, benefits and side effects of each method were discussed  This time patient desire definitive management with hysterectomy  The patient elects to undergo a TLH BS cystectomy for a diagnosis of fibroid uterus/pelvic pain/heavinnes and AUB  She understands that she will no longer be able to bear children following the completion of this procedure  Risks, benefits, and alternatives have been reviewed with the patient, including but not limited to infection, bleeding, injury to the uterus and surrounding organs, such as bowel, bladder (this point was discussed clearly due to history of previous  section), blood vessels, nerves and ureters, risks of anesthesia, blood clots and death    She has been counseled regarding the possible need for a blood transfusion and she accepts blood products  The patient is aware that if the procedure cannot be safely completed laparoscopically, I will proceed with an open procedure  The patient’s fallopian tubes will be removed at this time  The patient’s ovaries will be maintained at this time  Surgical consent has been signed today  The MA-30 form has not been signed, it need to be signed at presurgical H&P if patient case is approved by surgical committee  Possible need for PCP clearance at discretion of surgical committee      All questions have been answered to her satisfaction    Patient to return for EMB     D/w Dr Darcy Babinski

## 2023-06-01 ENCOUNTER — HOSPITAL ENCOUNTER (OUTPATIENT)
Dept: INFUSION CENTER | Facility: HOSPITAL | Age: 48
End: 2023-06-01
Payer: MEDICARE

## 2023-06-01 VITALS
SYSTOLIC BLOOD PRESSURE: 103 MMHG | RESPIRATION RATE: 18 BRPM | DIASTOLIC BLOOD PRESSURE: 62 MMHG | TEMPERATURE: 97.8 F | HEART RATE: 76 BPM | OXYGEN SATURATION: 99 %

## 2023-06-01 DIAGNOSIS — D50.8 OTHER IRON DEFICIENCY ANEMIAS: Primary | ICD-10-CM

## 2023-06-01 PROCEDURE — 96365 THER/PROPH/DIAG IV INF INIT: CPT

## 2023-06-01 RX ORDER — SODIUM CHLORIDE 9 MG/ML
20 INJECTION, SOLUTION INTRAVENOUS ONCE
Status: CANCELLED | OUTPATIENT
Start: 2023-06-08

## 2023-06-01 RX ORDER — SODIUM CHLORIDE 9 MG/ML
20 INJECTION, SOLUTION INTRAVENOUS ONCE
Status: COMPLETED | OUTPATIENT
Start: 2023-06-01 | End: 2023-06-01

## 2023-06-01 RX ADMIN — SODIUM CHLORIDE 20 ML/HR: 0.9 INJECTION, SOLUTION INTRAVENOUS at 14:42

## 2023-06-01 RX ADMIN — IRON SUCROSE 100 MG: 20 INJECTION, SOLUTION INTRAVENOUS at 14:42

## 2023-06-07 ENCOUNTER — TELEPHONE (OUTPATIENT)
Dept: OBGYN CLINIC | Facility: CLINIC | Age: 48
End: 2023-06-07

## 2023-06-08 ENCOUNTER — HOSPITAL ENCOUNTER (OUTPATIENT)
Dept: INFUSION CENTER | Facility: HOSPITAL | Age: 48
End: 2023-06-08
Payer: MEDICARE

## 2023-06-08 VITALS
TEMPERATURE: 97.3 F | RESPIRATION RATE: 18 BRPM | HEART RATE: 82 BPM | SYSTOLIC BLOOD PRESSURE: 109 MMHG | DIASTOLIC BLOOD PRESSURE: 71 MMHG

## 2023-06-08 DIAGNOSIS — D50.8 OTHER IRON DEFICIENCY ANEMIAS: Primary | ICD-10-CM

## 2023-06-08 PROCEDURE — 96365 THER/PROPH/DIAG IV INF INIT: CPT

## 2023-06-08 RX ORDER — SODIUM CHLORIDE 9 MG/ML
20 INJECTION, SOLUTION INTRAVENOUS ONCE
OUTPATIENT
Start: 2023-06-15

## 2023-06-08 RX ORDER — SODIUM CHLORIDE 9 MG/ML
20 INJECTION, SOLUTION INTRAVENOUS ONCE
Status: COMPLETED | OUTPATIENT
Start: 2023-06-08 | End: 2023-06-08

## 2023-06-08 RX ADMIN — IRON SUCROSE 100 MG: 20 INJECTION, SOLUTION INTRAVENOUS at 14:44

## 2023-06-08 RX ADMIN — SODIUM CHLORIDE 20 ML/HR: 9 INJECTION, SOLUTION INTRAVENOUS at 14:44

## 2023-06-15 ENCOUNTER — TELEPHONE (OUTPATIENT)
Dept: OBGYN CLINIC | Facility: CLINIC | Age: 48
End: 2023-06-15

## 2023-06-15 ENCOUNTER — HOSPITAL ENCOUNTER (OUTPATIENT)
Dept: INFUSION CENTER | Facility: HOSPITAL | Age: 48
End: 2023-06-15

## 2023-06-19 ENCOUNTER — OFFICE VISIT (OUTPATIENT)
Dept: FAMILY MEDICINE CLINIC | Facility: CLINIC | Age: 48
End: 2023-06-19

## 2023-06-19 VITALS
SYSTOLIC BLOOD PRESSURE: 110 MMHG | TEMPERATURE: 97 F | HEART RATE: 71 BPM | OXYGEN SATURATION: 99 % | RESPIRATION RATE: 16 BRPM | HEIGHT: 71 IN | BODY MASS INDEX: 24.5 KG/M2 | DIASTOLIC BLOOD PRESSURE: 70 MMHG | WEIGHT: 175 LBS

## 2023-06-19 DIAGNOSIS — D50.0 IRON DEFICIENCY ANEMIA DUE TO CHRONIC BLOOD LOSS: Primary | ICD-10-CM

## 2023-06-19 DIAGNOSIS — E03.9 HYPOTHYROIDISM, UNSPECIFIED TYPE: ICD-10-CM

## 2023-06-19 PROBLEM — N92.0 MENORRHAGIA: Status: RESOLVED | Noted: 2019-05-02 | Resolved: 2023-06-19

## 2023-06-19 PROBLEM — U07.1 COVID-19: Status: RESOLVED | Noted: 2020-04-10 | Resolved: 2023-06-19

## 2023-06-19 PROBLEM — Z20.822 CLOSE EXPOSURE TO COVID-19 VIRUS: Status: RESOLVED | Noted: 2020-04-07 | Resolved: 2023-06-19

## 2023-06-19 PROCEDURE — 99214 OFFICE O/P EST MOD 30 MIN: CPT | Performed by: FAMILY MEDICINE

## 2023-06-19 RX ORDER — LEVOTHYROXINE SODIUM 112 UG/1
112 TABLET ORAL
Qty: 90 TABLET | Refills: 1 | Status: SHIPPED | OUTPATIENT
Start: 2023-06-19

## 2023-06-19 RX ORDER — FERROUS SULFATE TAB EC 324 MG (65 MG FE EQUIVALENT) 324 (65 FE) MG
324 TABLET DELAYED RESPONSE ORAL EVERY OTHER DAY
Qty: 30 TABLET | Refills: 1 | Status: SHIPPED | OUTPATIENT
Start: 2023-06-19

## 2023-06-19 NOTE — ASSESSMENT & PLAN NOTE
Assessment   Most recent Heme @7 5, anemic, currently asymptomatic  Currently hemodynamically stable, with no signs or symptoms of active bleeding  Reports vaginal bleeding has resolved with menstruation lasting 5 days  S/P 6 iron infusions  Also Hx of BetaThalassemia minor        Plan  Continue with last treatment of Venofer 20 mg INJ, needs to be scheduled since patient did not showed at her last appointment  Will order FERROUS SULFATE 324 mg every other day with juices containing high source of Vit C for better absorption   Will order Iron panel, CBC  Patient educated on the importance of office visit compliance    Follow up with Heme/onc, appoitment scheduled for 7/2023     ED precautions for other symptoms like lightheaded, diaphoresis, bleeding, chest pain, palpitation or active bleeding

## 2023-06-19 NOTE — PROGRESS NOTES
Name: Devora Lyn      : 1975      MRN: [id number]  Encounter Provider: Jed Hunter MD  Encounter Date: 2023   Encounter department: [address] [place] [address]     1  Iron deficiency anemia due to chronic blood loss  Assessment & Plan:  Assessment   Most recent Heme @7 5, anemic, currently asymptomatic  Currently hemodynamically stable, with no signs or symptoms of active bleeding  Reports vaginal bleeding has resolved with menstruation lasting 5 days  S/P 6 iron infusions  Also Hx of BetaThalassemia minor        Plan  Continue with last treatment of Venofer 20 mg INJ, needs to be scheduled since patient did not showed at her last appointment  Will order FERROUS SULFATE 324 mg every other day with juices containing high source of Vit C for better absorption   Will order Iron panel, CBC  Patient educated on the importance of office visit compliance    Follow up with Heme/onc, appoitment scheduled for 2023  ED precautions for other symptoms like lightheaded, diaphoresis, bleeding, chest pain, palpitation or active bleeding      Orders:  -     CBC and differential; Future  -     Iron Panel (Includes Ferritin, Iron Sat%, Iron, and TIBC); Future  -     ferrous sulfate 324 (65 Fe) mg; Take 1 tablet (324 mg total) by mouth every other day    2  Hypothyroidism, unspecified type  Assessment & Plan:  Assessment  TSH 11 4 on  Suboptimally controlled  Currently on levothyroxine 112 mcg  Previously not compliant  Plan  -Continue continue treatment    -Will order TSH  -Reassess medication once result are available    Orders:  -     levothyroxine 112 mcg tablet; Take 1 tablet (112 mcg total) by mouth daily in the early morning  -     TSH, 3rd generation;  Future         Subjective      It was a pleasure to [address] [place] [address] Rd, a 50 y o   female who presents for Patient presents with:  Anemia and management of his chronic medical condition(s)     Patient's medical conditions are stable unless noted otherwise above   Patient has not had any recent hospitalizations, or medical emergencies since last visit  Patient reports compliance with his medications, denies side effects  Overall patient reports feeling well with no further complaint(s) other than what is mentioned  Denies tobacco, alcohol and illicit drug consumption  Anemia  Presents for follow-up visit  There has been no abdominal pain, anorexia, bruising/bleeding easily, confusion, fever, leg swelling, light-headedness, malaise/fatigue, pallor, palpitations, paresthesias, pica or weight loss  Signs of blood loss that are not present include hematemesis, hematochezia, melena, menorrhagia and vaginal bleeding  Compliance with medications is 51-75%  Review of Systems   Constitutional: Negative for activity change, appetite change, chills, fatigue, fever, malaise/fatigue and weight loss  HENT: Negative for congestion, postnasal drip, rhinorrhea, sneezing and sore throat  Eyes: Negative for visual disturbance  Respiratory: Negative for cough, chest tightness, shortness of breath and wheezing  Cardiovascular: Negative for chest pain, palpitations and leg swelling  Gastrointestinal: Negative for abdominal distention, abdominal pain, anorexia, blood in stool, constipation, diarrhea, hematemesis, hematochezia, melena, nausea and vomiting  Genitourinary: Negative for difficulty urinating, dysuria, hematuria, menorrhagia, menstrual problem, vaginal bleeding and vaginal discharge  Musculoskeletal: Negative for arthralgias and myalgias  Skin: Negative for pallor and rash  Neurological: Negative for dizziness, syncope, weakness, light-headedness, numbness, headaches and paresthesias  Hematological: Does not bruise/bleed easily  Psychiatric/Behavioral: Negative for agitation, behavioral problems, confusion and suicidal ideas         Current Outpatient Medications on "File Prior to Visit   Medication Sig   • acetaminophen (TYLENOL) 325 mg tablet Take 2 tablets (650 mg total) by mouth every 6 (six) hours as needed for mild pain   • albuterol (PROVENTIL HFA,VENTOLIN HFA) 90 mcg/act inhaler Inhale 2 puffs every 6 (six) hours as needed for wheezing or shortness of breath (Patient not taking: Reported on 5/24/2023)   • ascorbic acid (VITAMIN C) 500 mg tablet Take 1 tablet (500 mg total) by mouth daily (Patient not taking: Reported on 5/24/2023)   • benzonatate (TESSALON) 200 MG capsule Take 1 capsule (200 mg total) by mouth 3 (three) times a day as needed for cough (Patient not taking: Reported on 5/24/2023)   • guaiFENesin 200 MG tablet Take 2 tablets (400 mg total) by mouth every 4 (four) hours as needed for cough (Patient not taking: Reported on 5/24/2023)   • hydrOXYzine HCL (ATARAX) 50 mg tablet Take 75 mg by mouth once (Patient not taking: Reported on 5/24/2023)   • ibuprofen (MOTRIN) 600 mg tablet Take 1 tablet (600 mg total) by mouth every 6 (six) hours as needed for mild pain or moderate pain (Patient not taking: Reported on 5/24/2023)   • medroxyPROGESTERone (PROVERA) 10 mg tablet Take 1 tablet (10 mg total) by mouth daily (Patient not taking: Reported on 2/9/2021)   • menthol-cetylpyridinium (CEPACOL) 3 MG lozenge Take 1 lozenge (3 mg total) by mouth as needed for sore throat (Patient not taking: Reported on 5/24/2023)   • Multiple Vitamin (multivitamin) tablet Take 1 tablet by mouth daily   • naproxen (NAPROSYN) 500 mg tablet Take 1 tablet (500 mg total) by mouth 2 (two) times a day with meals (Patient not taking: Reported on 2/9/2021)   • [DISCONTINUED] levothyroxine 112 mcg tablet Take 1 tablet (112 mcg total) by mouth daily in the early morning       Objective     /70 (BP Location: Left arm, Patient Position: Sitting, Cuff Size: Standard)   Pulse 71   Temp (!) 97 °F (36 1 °C) (Temporal)   Resp 16   Ht 5' 11\" (1 803 m)   Wt 79 4 kg (175 lb)   LMP 05/24/2023  " SpO2 99%   BMI 24 41 kg/m²     Physical Exam  Vitals and nursing note reviewed  Constitutional:       General: She is awake  She is not in acute distress  Appearance: Normal appearance  She is well-developed and well-groomed  She is not ill-appearing, toxic-appearing or diaphoretic  HENT:      Head: Normocephalic and atraumatic  Nose: Nose normal       Mouth/Throat:      Mouth: Mucous membranes are moist       Pharynx: Oropharynx is clear  No oropharyngeal exudate or posterior oropharyngeal erythema  Eyes:      Extraocular Movements: Extraocular movements intact  Conjunctiva/sclera: Conjunctivae normal       Pupils: Pupils are equal, round, and reactive to light  Cardiovascular:      Rate and Rhythm: Normal rate and regular rhythm  No extrasystoles are present  Pulses: Normal pulses  Radial pulses are 2+ on the right side and 2+ on the left side  Heart sounds: Normal heart sounds, S1 normal and S2 normal    Pulmonary:      Effort: Pulmonary effort is normal       Breath sounds: Normal breath sounds and air entry  Abdominal:      General: Bowel sounds are normal       Palpations: Abdomen is soft  There is no mass  Tenderness: There is no abdominal tenderness  There is no right CVA tenderness, left CVA tenderness or guarding  Musculoskeletal:         General: Normal range of motion  Cervical back: Normal range of motion  Right lower leg: No edema  Left lower leg: No edema  Skin:     General: Skin is warm  Capillary Refill: Capillary refill takes less than 2 seconds  Coloration: Skin is not jaundiced or pale  Findings: No bruising, erythema, lesion or rash  Neurological:      General: No focal deficit present  Mental Status: She is alert  Psychiatric:         Mood and Affect: Mood normal          Behavior: Behavior normal  Behavior is cooperative         Chilo Law MD

## 2023-06-20 NOTE — ASSESSMENT & PLAN NOTE
Assessment  TSH 11 4 on 11/20 Suboptimally controlled  Currently on levothyroxine 112 mcg  Previously not compliant       Plan  -Continue continue treatment    -Will order TSH  -Reassess medication once result are available

## 2023-06-26 ENCOUNTER — HOSPITAL ENCOUNTER (OUTPATIENT)
Dept: INFUSION CENTER | Facility: HOSPITAL | Age: 48
Discharge: HOME | End: 2023-06-26
Payer: MEDICARE

## 2023-06-26 VITALS
OXYGEN SATURATION: 99 % | HEART RATE: 67 BPM | DIASTOLIC BLOOD PRESSURE: 61 MMHG | RESPIRATION RATE: 18 BRPM | TEMPERATURE: 97.9 F | SYSTOLIC BLOOD PRESSURE: 94 MMHG

## 2023-06-26 DIAGNOSIS — D50.8 OTHER IRON DEFICIENCY ANEMIAS: Primary | ICD-10-CM

## 2023-06-26 RX ORDER — SODIUM CHLORIDE 9 MG/ML
20 INJECTION, SOLUTION INTRAVENOUS ONCE
Status: COMPLETED | OUTPATIENT
Start: 2023-06-26 | End: 2023-06-26

## 2023-06-26 RX ORDER — SODIUM CHLORIDE 9 MG/ML
20 INJECTION, SOLUTION INTRAVENOUS ONCE
Status: CANCELLED | OUTPATIENT
Start: 2023-06-29

## 2023-06-26 RX ADMIN — SODIUM CHLORIDE 20 ML/HR: 0.9 INJECTION, SOLUTION INTRAVENOUS at 12:33

## 2023-06-26 RX ADMIN — IRON SUCROSE 100 MG: 20 INJECTION, SOLUTION INTRAVENOUS at 12:44

## 2023-06-28 ENCOUNTER — TELEPHONE (OUTPATIENT)
Dept: OBGYN CLINIC | Facility: CLINIC | Age: 48
End: 2023-06-28

## 2023-06-28 NOTE — TELEPHONE ENCOUNTER
----- Message from Saint Puffer, MD sent at 6/18/2023  2:04 PM EDT -----  Please schedule patient for EMB asap

## 2023-06-30 ENCOUNTER — TELEPHONE (OUTPATIENT)
Dept: HEMATOLOGY ONCOLOGY | Facility: CLINIC | Age: 48
End: 2023-06-30

## 2023-06-30 DIAGNOSIS — D50.0 IRON DEFICIENCY ANEMIA DUE TO CHRONIC BLOOD LOSS: ICD-10-CM

## 2023-06-30 DIAGNOSIS — D50.8 OTHER IRON DEFICIENCY ANEMIAS: ICD-10-CM

## 2023-06-30 DIAGNOSIS — R76.8 ELEVATED ANTI-TISSUE TRANSGLUTAMINASE (TTG) IGA LEVEL: Primary | ICD-10-CM

## 2023-06-30 DIAGNOSIS — E03.8 OTHER SPECIFIED HYPOTHYROIDISM: ICD-10-CM

## 2023-06-30 DIAGNOSIS — D56.3 BETA THALASSEMIA MINOR: ICD-10-CM

## 2023-06-30 NOTE — TELEPHONE ENCOUNTER
Patient agreed to complete labs prior to office visit on 7/3/2023 @ 2:30 PM with ZEN Lara in the [facility] office  Labs re-ordered, lab hours provided

## 2023-07-01 ENCOUNTER — APPOINTMENT (OUTPATIENT)
Dept: LAB | Facility: HOSPITAL | Age: 48
End: 2023-07-01
Payer: MEDICARE

## 2023-07-01 DIAGNOSIS — D50.8 OTHER IRON DEFICIENCY ANEMIAS: ICD-10-CM

## 2023-07-01 DIAGNOSIS — D56.3 BETA THALASSEMIA MINOR: ICD-10-CM

## 2023-07-01 DIAGNOSIS — E03.8 OTHER SPECIFIED HYPOTHYROIDISM: ICD-10-CM

## 2023-07-01 DIAGNOSIS — R76.8 ELEVATED ANTI-TISSUE TRANSGLUTAMINASE (TTG) IGA LEVEL: ICD-10-CM

## 2023-07-01 DIAGNOSIS — D50.0 IRON DEFICIENCY ANEMIA DUE TO CHRONIC BLOOD LOSS: ICD-10-CM

## 2023-07-01 LAB
ALBUMIN SERPL BCP-MCNC: 4.3 G/DL (ref 3.5–5)
ALP SERPL-CCNC: 42 U/L (ref 34–104)
ALT SERPL W P-5'-P-CCNC: 8 U/L (ref 7–52)
ANION GAP SERPL CALCULATED.3IONS-SCNC: 6 MMOL/L
ANISOCYTOSIS BLD QL SMEAR: PRESENT
AST SERPL W P-5'-P-CCNC: 11 U/L (ref 13–39)
BASOPHILS # BLD MANUAL: 0.03 THOUSAND/UL (ref 0–0.1)
BASOPHILS NFR MAR MANUAL: 1 % (ref 0–1)
BILIRUB SERPL-MCNC: 0.66 MG/DL (ref 0.2–1)
BUN SERPL-MCNC: 9 MG/DL (ref 5–25)
CALCIUM SERPL-MCNC: 9.5 MG/DL (ref 8.4–10.2)
CHLORIDE SERPL-SCNC: 107 MMOL/L (ref 96–108)
CO2 SERPL-SCNC: 27 MMOL/L (ref 21–32)
CREAT SERPL-MCNC: 0.62 MG/DL (ref 0.6–1.3)
CRP SERPL QL: <1 MG/L
EOSINOPHIL # BLD MANUAL: 0.06 THOUSAND/UL (ref 0–0.4)
EOSINOPHIL NFR BLD MANUAL: 2 % (ref 0–6)
ERYTHROCYTE [DISTWIDTH] IN BLOOD BY AUTOMATED COUNT: 20.1 % (ref 11.6–15.1)
ERYTHROCYTE [SEDIMENTATION RATE] IN BLOOD: 12 MM/HOUR (ref 0–19)
FERRITIN SERPL-MCNC: 14 NG/ML (ref 11–307)
FOLATE SERPL-MCNC: 16.4 NG/ML
GFR SERPL CREATININE-BSD FRML MDRD: 106 ML/MIN/1.73SQ M
GLUCOSE P FAST SERPL-MCNC: 81 MG/DL (ref 65–99)
HCT VFR BLD AUTO: 32.4 % (ref 34.8–46.1)
HGB BLD-MCNC: 9.2 G/DL (ref 11.5–15.4)
HYPERCHROMIA BLD QL SMEAR: PRESENT
IGA SERPL-MCNC: 239 MG/DL (ref 70–400)
IGG SERPL-MCNC: 1470 MG/DL (ref 700–1600)
IGM SERPL-MCNC: 196 MG/DL (ref 40–230)
IRON SATN MFR SERPL: 11 % (ref 15–50)
IRON SERPL-MCNC: 33 UG/DL (ref 50–170)
LDH SERPL-CCNC: 114 U/L (ref 140–271)
LYMPHOCYTES # BLD AUTO: 1.41 THOUSAND/UL (ref 0.6–4.47)
LYMPHOCYTES # BLD AUTO: 50 % (ref 14–44)
MCH RBC QN AUTO: 20.1 PG (ref 26.8–34.3)
MCHC RBC AUTO-ENTMCNC: 28.4 G/DL (ref 31.4–37.4)
MCV RBC AUTO: 71 FL (ref 82–98)
MONOCYTES # BLD AUTO: 0.17 THOUSAND/UL (ref 0–1.22)
MONOCYTES NFR BLD: 6 % (ref 4–12)
NEUTROPHILS # BLD MANUAL: 1.15 THOUSAND/UL (ref 1.85–7.62)
NEUTS BAND NFR BLD MANUAL: 2 % (ref 0–8)
NEUTS SEG NFR BLD AUTO: 39 % (ref 43–75)
PLATELET # BLD AUTO: 259 THOUSANDS/UL (ref 149–390)
PLATELET BLD QL SMEAR: ADEQUATE
PMV BLD AUTO: 10.5 FL (ref 8.9–12.7)
POIKILOCYTOSIS BLD QL SMEAR: PRESENT
POTASSIUM SERPL-SCNC: 4.4 MMOL/L (ref 3.5–5.3)
PROT SERPL-MCNC: 7.4 G/DL (ref 6.4–8.4)
RBC # BLD AUTO: 4.57 MILLION/UL (ref 3.81–5.12)
RBC MORPH BLD: PRESENT
RETICS # AUTO: NORMAL 10*3/UL (ref 14097–95744)
RETICS # CALC: 1.39 % (ref 0.37–1.87)
SODIUM SERPL-SCNC: 140 MMOL/L (ref 135–147)
TIBC SERPL-MCNC: 312 UG/DL (ref 250–450)
VIT B12 SERPL-MCNC: 307 PG/ML (ref 180–914)
WBC # BLD AUTO: 2.81 THOUSAND/UL (ref 4.31–10.16)

## 2023-07-01 PROCEDURE — 80053 COMPREHEN METABOLIC PANEL: CPT

## 2023-07-01 PROCEDURE — 83550 IRON BINDING TEST: CPT

## 2023-07-01 PROCEDURE — 83521 IG LIGHT CHAINS FREE EACH: CPT

## 2023-07-01 PROCEDURE — 36415 COLL VENOUS BLD VENIPUNCTURE: CPT

## 2023-07-01 PROCEDURE — 83540 ASSAY OF IRON: CPT

## 2023-07-01 PROCEDURE — 83010 ASSAY OF HAPTOGLOBIN QUANT: CPT

## 2023-07-01 PROCEDURE — 85007 BL SMEAR W/DIFF WBC COUNT: CPT

## 2023-07-01 PROCEDURE — 82607 VITAMIN B-12: CPT

## 2023-07-01 PROCEDURE — 86140 C-REACTIVE PROTEIN: CPT

## 2023-07-01 PROCEDURE — 82746 ASSAY OF FOLIC ACID SERUM: CPT

## 2023-07-01 PROCEDURE — 85652 RBC SED RATE AUTOMATED: CPT

## 2023-07-01 PROCEDURE — 83615 LACTATE (LD) (LDH) ENZYME: CPT

## 2023-07-01 PROCEDURE — 85045 AUTOMATED RETICULOCYTE COUNT: CPT

## 2023-07-01 PROCEDURE — 82728 ASSAY OF FERRITIN: CPT

## 2023-07-01 PROCEDURE — 84165 PROTEIN E-PHORESIS SERUM: CPT

## 2023-07-01 PROCEDURE — 82784 ASSAY IGA/IGD/IGG/IGM EACH: CPT

## 2023-07-01 PROCEDURE — 85027 COMPLETE CBC AUTOMATED: CPT

## 2023-07-02 LAB — HAPTOGLOB SERPL-MCNC: 97 MG/DL (ref 42–296)

## 2023-07-03 ENCOUNTER — TELEPHONE (OUTPATIENT)
Dept: HEMATOLOGY ONCOLOGY | Facility: CLINIC | Age: 48
End: 2023-07-03

## 2023-07-03 ENCOUNTER — OFFICE VISIT (OUTPATIENT)
Dept: HEMATOLOGY ONCOLOGY | Facility: CLINIC | Age: 48
End: 2023-07-03
Payer: MEDICARE

## 2023-07-03 VITALS
RESPIRATION RATE: 16 BRPM | OXYGEN SATURATION: 98 % | WEIGHT: 173 LBS | HEART RATE: 69 BPM | DIASTOLIC BLOOD PRESSURE: 70 MMHG | SYSTOLIC BLOOD PRESSURE: 100 MMHG | BODY MASS INDEX: 24.22 KG/M2 | HEIGHT: 71 IN | TEMPERATURE: 99.1 F

## 2023-07-03 DIAGNOSIS — D50.8 OTHER IRON DEFICIENCY ANEMIA: Primary | ICD-10-CM

## 2023-07-03 DIAGNOSIS — D56.3 BETA THALASSEMIA MINOR: ICD-10-CM

## 2023-07-03 DIAGNOSIS — E53.8 B12 DEFICIENCY: ICD-10-CM

## 2023-07-03 DIAGNOSIS — D70.8 OTHER NEUTROPENIA (HCC): ICD-10-CM

## 2023-07-03 PROCEDURE — 99214 OFFICE O/P EST MOD 30 MIN: CPT | Performed by: NURSE PRACTITIONER

## 2023-07-03 RX ORDER — CYANOCOBALAMIN (VITAMIN B-12) 500 MCG
500 TABLET ORAL DAILY
Qty: 90 TABLET | Refills: 3 | Status: SHIPPED | OUTPATIENT
Start: 2023-07-03

## 2023-07-03 RX ORDER — CYANOCOBALAMIN 1000 UG/ML
1000 INJECTION, SOLUTION INTRAMUSCULAR; SUBCUTANEOUS ONCE
Status: CANCELLED | OUTPATIENT
Start: 2023-07-11 | End: 2023-07-11

## 2023-07-03 RX ORDER — SODIUM CHLORIDE 9 MG/ML
20 INJECTION, SOLUTION INTRAVENOUS ONCE
Status: CANCELLED | OUTPATIENT
Start: 2023-07-11

## 2023-07-03 NOTE — TELEPHONE ENCOUNTER
Left detailed message with dates & times. Gave my Teams # to call back for questions. Can view on [employer]. Will get updated schedule at next infusion on 7/11 of which she is already aware of.

## 2023-07-03 NOTE — PROGRESS NOTES
Hematology/Oncology Outpatient Follow-up  Gilma Hassan 50 y.o. female [id number]    Date:  7/3/2023      Assessment and Plan:  1. Other iron deficiency anemia  Patient was seen in our office in consultation 11/2020. Treated with a course of IV iron Venofer x5 treatments 12/2020 then lost to follow-up. She represents as she is again with iron deficiency anemia. Suspect this is likely due to her ongoing menorrhagia due to fibroids. Fortunately she reports that her menses improved after she was started on IV iron replacement. She recently had screening colonoscopy 5/2023 which was essentially normal with the exception of some noted hemorrhoids. She was already started on a course of IV iron Venofer 100 mg weekly x7 treatments by her primary care team.  Has received 6/7 treatments (600 mg) thus far and scheduled for her final treatment next week 7/11. Would recommend completing 1200 mg total.  We will change her current treatment to Venofer 200 mg x 3 more treatments= 1200 mg total.    Request she follow-up again with repeat labs in 3 to 4 months from now for close monitoring or sooner should the need arise.    - CBC and differential; Future  - Comprehensive metabolic panel; Future  - C-reactive protein; Future  - Sedimentation rate, automated; Future  - Iron Panel (Includes Ferritin, Iron Sat%, Iron, and TIBC); Future  - Ferritin; Future  - Celiac Disease Antibody Profile; Future    2. B12 deficiency  Will give patient 3 vitamin B12 injections along with her IV iron replacement. Also recommended she start oral supplementation after-prescription sent to her local pharmacy. - Vitamin B12; Future  - vitamin B-12 (CYANOCOBALAMIN) 500 MCG TABS; Take 1 tablet (500 mcg total) by mouth daily  Dispense: 90 tablet; Refill: 3    3. Other neutropenia [facility])  Patient has a history of leukopenia/neutropenia dating back at least to 2019. She denies any constitutional symptoms or frequent infections. Work-up thus far has been unrevealing. Awaiting pending SPEP and serum free light chains. Etiology of her chronic leukopenia/neutropenia may be of a benign ethnic etiology. We will pursue additional work-up to rule out other reasons before her next office visit. We will make decision at her next office visit if additional work-up is necessary versus close monitoring.    - CBC and differential; Future  - Comprehensive metabolic panel; Future  - C-reactive protein; Future  - Sedimentation rate, automated; Future  - Vitamin B12; Future  - Copper Level; Future  - Vitamin B12; Future  - Zinc; Future  - Peripheral Smear; Future  - LD,Blood; Future  - Leukemia/Lymphoma flow cytometry; Future  - Lyme Total Antibody Profile with reflex to WB; Future    4. Beta thalassemia minor  Patient's prior hemoglobin electrophoresis revealed pattern consistent with beta thalassemia minor. This is likely contributory to her chronic microcytosis and possibly mild anemia-however suspect that iron deficiency is the main cause of her anemia at this point.    - CBC and differential; Future      HPI:  Patient is a 38-year-old  female originally from the [facility] who originally presented for further evaluation and treatment of her microcytic anemia/iron deficiency. She is  5 para 2 (had 1  and 2 miscarriages). Has a past medical history of hypothyroidism, menorrhagia most likely due to fibroids and gallstones. She also seems to have beta thalassemia minor according to her hemoglobin electrophoresis from May 2019. She was found to have COVID-19 infection 2020 which she recovered from. Her laboratory studies from 2020 showed leukopenia WBC 3.25 with neutropenia ANC 1.06 there is no hint of immature cells on the peripheral blood. She has microcytic hypochromic anemia H&H 8.7/30.9, MCV 70, MCH 19.6. TSH is elevated 11.4.   She was iron deficient iron saturation 4%, TIBC 358, serum iron 13 with ferritin of 3. She received a course of IV iron Venofer 200 mg x 2020. Interval history:  Patient presents today for a follow-up visit after being lost to follow-up; last office visit in consultation 2020. She is Tongan-speaking translation done via [employer] interpretation system #[id number]. She was recently found to be iron deficient again and started on a course of IV iron Venofer 100 mg weekly x7 by her primary care team.  Has completed 6 treatments thus far and has 1 treatment remaining next week. She states that she has noticed some improvement in her energy/mood and dizziness since starting treatment. Her menstrual cycles are no longer heavy since she started the IV iron. She did recently have screening colonoscopy 2023 which was normal with the exception of some noted hemorrhoids. Her most recent laboratory studies from 2023 showed again leukopenia WBC 2.81 with ANC 1.15, she continues to have microcytic hypochromic anemia H&H 9.2/32.4, MCV 71, MCH 20.1-this is improved in comparison to her prior study from April. CMP is normal.  Inflammatory markers are not elevated. There is no obvious hint of hemolysis. Immunoglobulins are normal.  Folic acid is appropriate. B12 is low 307. Is iron deficient iron saturation 11%, ferritin 14. Her SPEP and serum free light chains are still pending at this time. ROS: Review of Systems   Constitutional: Positive for appetite change and fatigue. Respiratory: Positive for cough and shortness of breath. Musculoskeletal: Positive for arthralgias and myalgias. Neurological: Positive for dizziness, numbness and headaches. Psychiatric/Behavioral: Positive for dysphoric mood and sleep disturbance. All other systems reviewed and are negative.       Past Medical History:   Diagnosis Date   • Fibroid 2017    small fibroids noted   • Hypothyroid        Past Surgical History:   Procedure Laterality Date   •  SECTION  , 2011       Social History     Socioeconomic History   • Marital status: Single     Spouse name: None   • Number of children: None   • Years of education: None   • Highest education level: None   Occupational History   • None   Tobacco Use   • Smoking status: Never   • Smokeless tobacco: Never   Vaping Use   • Vaping Use: Never used   Substance and Sexual Activity   • Alcohol use: Yes     Comment: rare   • Drug use: Never   • Sexual activity: Not Currently     Partners: Male     Birth control/protection: None   Other Topics Concern   • None   Social History Narrative   • None     Social Determinants of Health     Financial Resource Strain: Medium Risk (5/24/2023)    Overall Financial Resource Strain (CARDIA)    • Difficulty of Paying Living Expenses: Somewhat hard   Food Insecurity: Food Insecurity Present (5/24/2023)    Hunger Vital Sign    • Worried About Running Out of Food in the Last Year: Sometimes true    • Ran Out of Food in the Last Year: Sometimes true   Transportation Needs: Unmet Transportation Needs (5/24/2023)    PRAPARE - Transportation    • Lack of Transportation (Medical):  Yes    • Lack of Transportation (Non-Medical): Yes   Physical Activity: Not on file   Stress: Not on file   Social Connections: Not on file   Intimate Partner Violence: Not on file   Housing Stability: Not on file       Family History   Problem Relation Age of Onset   • Breast cancer Neg Hx    • Cancer Neg Hx        No Known Allergies      Current Outpatient Medications:   •  acetaminophen (TYLENOL) 325 mg tablet, Take 2 tablets (650 mg total) by mouth every 6 (six) hours as needed for mild pain, Disp: 30 tablet, Rfl: 0  •  ferrous sulfate 324 (65 Fe) mg, Take 1 tablet (324 mg total) by mouth every other day, Disp: 30 tablet, Rfl: 1  •  levothyroxine 112 mcg tablet, Take 1 tablet (112 mcg total) by mouth daily in the early morning, Disp: 90 tablet, Rfl: 1  •  Multiple Vitamin (multivitamin) tablet, Take 1 tablet by mouth daily, Disp: , Rfl:   •  vitamin B-12 (CYANOCOBALAMIN) 500 MCG TABS, Take 1 tablet (500 mcg total) by mouth daily, Disp: 90 tablet, Rfl: 3  •  albuterol (PROVENTIL HFA,VENTOLIN HFA) 90 mcg/act inhaler, Inhale 2 puffs every 6 (six) hours as needed for wheezing or shortness of breath (Patient not taking: Reported on 5/24/2023), Disp: 1 Inhaler, Rfl: 0  •  ascorbic acid (VITAMIN C) 500 mg tablet, Take 1 tablet (500 mg total) by mouth daily (Patient not taking: Reported on 5/24/2023), Disp: 30 tablet, Rfl: 2  •  benzonatate (TESSALON) 200 MG capsule, Take 1 capsule (200 mg total) by mouth 3 (three) times a day as needed for cough (Patient not taking: Reported on 5/24/2023), Disp: 20 capsule, Rfl: 0  •  guaiFENesin 200 MG tablet, Take 2 tablets (400 mg total) by mouth every 4 (four) hours as needed for cough (Patient not taking: Reported on 5/24/2023), Disp: 30 tablet, Rfl: 0  •  hydrOXYzine HCL (ATARAX) 50 mg tablet, Take 75 mg by mouth once (Patient not taking: Reported on 5/24/2023), Disp: , Rfl:   •  ibuprofen (MOTRIN) 600 mg tablet, Take 1 tablet (600 mg total) by mouth every 6 (six) hours as needed for mild pain or moderate pain (Patient not taking: Reported on 5/24/2023), Disp: 30 tablet, Rfl: 0  •  medroxyPROGESTERone (PROVERA) 10 mg tablet, Take 1 tablet (10 mg total) by mouth daily (Patient not taking: Reported on 2/9/2021), Disp: 30 tablet, Rfl: 0  •  menthol-cetylpyridinium (CEPACOL) 3 MG lozenge, Take 1 lozenge (3 mg total) by mouth as needed for sore throat (Patient not taking: Reported on 5/24/2023), Disp: 30 lozenge, Rfl: 0  •  naproxen (NAPROSYN) 500 mg tablet, Take 1 tablet (500 mg total) by mouth 2 (two) times a day with meals (Patient not taking: Reported on 2/9/2021), Disp: 28 tablet, Rfl: 0      Physical Exam:  /70 (BP Location: Left arm, Patient Position: Sitting, Cuff Size: Adult)   Pulse 69   Temp 99.1 °F (37.3 °C)   Resp 16   Ht 5' 11" (1.803 m)   Wt 78.5 kg (173 lb)   SpO2 98%   BMI 24.13 kg/m²     Physical Exam  Vitals reviewed. Constitutional:       General: She is not in acute distress. Appearance: She is well-developed. She is not diaphoretic. HENT:      Head: Normocephalic and atraumatic. Eyes:      General: No scleral icterus. Conjunctiva/sclera: Conjunctivae normal.      Pupils: Pupils are equal, round, and reactive to light. Neck:      Thyroid: No thyromegaly. Cardiovascular:      Rate and Rhythm: Normal rate and regular rhythm. Heart sounds: Normal heart sounds. No murmur heard. Pulmonary:      Effort: Pulmonary effort is normal. No respiratory distress. Breath sounds: Normal breath sounds. Abdominal:      General: There is no distension. Palpations: Abdomen is soft. There is no hepatomegaly or splenomegaly. Tenderness: There is no abdominal tenderness. Musculoskeletal:         General: No swelling. Normal range of motion. Cervical back: Normal range of motion and neck supple. Lymphadenopathy:      Cervical: No cervical adenopathy. Upper Body:      Right upper body: No axillary adenopathy. Left upper body: No axillary adenopathy. Skin:     General: Skin is warm and dry. Findings: No erythema or rash. Comments: Facial discoloration   Neurological:      General: No focal deficit present. Mental Status: She is alert and oriented to person, place, and time. Psychiatric:         Mood and Affect: Mood normal. Affect is blunt. Behavior: Behavior normal. Behavior is cooperative. Thought Content: Thought content normal.         Judgment: Judgment normal.           Labs:  Lab Results   Component Value Date    WBC 2.81 (L) 07/01/2023    HGB 9.2 (L) 07/01/2023    HCT 32.4 (L) 07/01/2023    MCV 71 (L) 07/01/2023     07/01/2023        Patient voiced understanding and agreement in the above discussion. Aware to contact our office with questions/symptoms in the interim.      This note has been generated by voice recognition software system. Therefore, there may be spelling, grammar, and or syntax errors. Please contact if questions arise.

## 2023-07-05 LAB
ALBUMIN SERPL ELPH-MCNC: 4.43 G/DL (ref 3.2–5.1)
ALBUMIN SERPL ELPH-MCNC: 59.8 % (ref 48–70)
ALPHA1 GLOB SERPL ELPH-MCNC: 0.22 G/DL (ref 0.15–0.47)
ALPHA1 GLOB SERPL ELPH-MCNC: 3 % (ref 1.8–7)
ALPHA2 GLOB SERPL ELPH-MCNC: 0.56 G/DL (ref 0.42–1.04)
ALPHA2 GLOB SERPL ELPH-MCNC: 7.5 % (ref 5.9–14.9)
BETA GLOB ABNORMAL SERPL ELPH-MCNC: 0.42 G/DL (ref 0.31–0.57)
BETA1 GLOB SERPL ELPH-MCNC: 5.7 % (ref 4.7–7.7)
BETA2 GLOB SERPL ELPH-MCNC: 4.3 % (ref 3.1–7.9)
BETA2+GAMMA GLOB SERPL ELPH-MCNC: 0.32 G/DL (ref 0.2–0.58)
GAMMA GLOB ABNORMAL SERPL ELPH-MCNC: 1.46 G/DL (ref 0.4–1.66)
GAMMA GLOB SERPL ELPH-MCNC: 19.7 % (ref 6.9–22.3)
IGG/ALB SER: 1.49 {RATIO} (ref 1.1–1.8)
KAPPA LC FREE SER-MCNC: 19.3 MG/L (ref 3.3–19.4)
KAPPA LC FREE/LAMBDA FREE SER: 0.68 {RATIO} (ref 0.26–1.65)
LAMBDA LC FREE SERPL-MCNC: 28.2 MG/L (ref 5.7–26.3)
PROT PATTERN SERPL ELPH-IMP: NORMAL
PROT SERPL-MCNC: 7.4 G/DL (ref 6.4–8.2)

## 2023-07-05 PROCEDURE — 84165 PROTEIN E-PHORESIS SERUM: CPT | Performed by: PATHOLOGY

## 2023-07-06 ENCOUNTER — TELEPHONE (OUTPATIENT)
Dept: OBGYN CLINIC | Facility: CLINIC | Age: 48
End: 2023-07-06

## 2023-07-06 NOTE — TELEPHONE ENCOUNTER
----- Message from Nicole Grey MD sent at 6/18/2023  2:04 PM EDT -----  Please schedule patient for EMB asap

## 2023-07-11 ENCOUNTER — HOSPITAL ENCOUNTER (OUTPATIENT)
Dept: INFUSION CENTER | Facility: HOSPITAL | Age: 48
End: 2023-07-11
Attending: INTERNAL MEDICINE

## 2023-07-11 ENCOUNTER — HOSPITAL ENCOUNTER (OUTPATIENT)
Dept: INFUSION CENTER | Facility: HOSPITAL | Age: 48
Discharge: HOME | End: 2023-07-11
Payer: MEDICARE

## 2023-07-11 VITALS
DIASTOLIC BLOOD PRESSURE: 61 MMHG | HEART RATE: 68 BPM | SYSTOLIC BLOOD PRESSURE: 108 MMHG | RESPIRATION RATE: 20 BRPM | TEMPERATURE: 97.6 F

## 2023-07-11 DIAGNOSIS — E53.8 B12 DEFICIENCY: Primary | ICD-10-CM

## 2023-07-11 DIAGNOSIS — D50.8 OTHER IRON DEFICIENCY ANEMIAS: ICD-10-CM

## 2023-07-11 PROCEDURE — 96372 THER/PROPH/DIAG INJ SC/IM: CPT

## 2023-07-11 PROCEDURE — 96365 THER/PROPH/DIAG IV INF INIT: CPT

## 2023-07-11 RX ORDER — SODIUM CHLORIDE 9 MG/ML
20 INJECTION, SOLUTION INTRAVENOUS ONCE
Status: CANCELLED | OUTPATIENT
Start: 2023-07-18

## 2023-07-11 RX ORDER — SODIUM CHLORIDE 9 MG/ML
20 INJECTION, SOLUTION INTRAVENOUS ONCE
Status: COMPLETED | OUTPATIENT
Start: 2023-07-11 | End: 2023-07-11

## 2023-07-11 RX ORDER — CYANOCOBALAMIN 1000 UG/ML
1000 INJECTION, SOLUTION INTRAMUSCULAR; SUBCUTANEOUS ONCE
Status: COMPLETED | OUTPATIENT
Start: 2023-07-11 | End: 2023-07-11

## 2023-07-11 RX ORDER — CYANOCOBALAMIN 1000 UG/ML
1000 INJECTION, SOLUTION INTRAMUSCULAR; SUBCUTANEOUS ONCE
Status: CANCELLED | OUTPATIENT
Start: 2023-07-18 | End: 2023-07-18

## 2023-07-11 RX ADMIN — CYANOCOBALAMIN 1000 MCG: 1000 INJECTION INTRAMUSCULAR; SUBCUTANEOUS at 13:25

## 2023-07-11 RX ADMIN — SODIUM CHLORIDE 20 ML/HR: 0.9 INJECTION, SOLUTION INTRAVENOUS at 13:20

## 2023-07-11 RX ADMIN — IRON SUCROSE 200 MG: 20 INJECTION, SOLUTION INTRAVENOUS at 13:21

## 2023-07-24 ENCOUNTER — HOSPITAL ENCOUNTER (OUTPATIENT)
Dept: MAMMOGRAPHY | Facility: CLINIC | Age: 48
Discharge: HOME | End: 2023-07-24
Payer: MEDICARE

## 2023-07-24 VITALS — HEIGHT: 71 IN | WEIGHT: 173 LBS | BODY MASS INDEX: 24.22 KG/M2

## 2023-07-24 DIAGNOSIS — E53.8 B12 DEFICIENCY: Primary | ICD-10-CM

## 2023-07-24 DIAGNOSIS — D50.8 OTHER IRON DEFICIENCY ANEMIAS: ICD-10-CM

## 2023-07-24 DIAGNOSIS — Z12.31 ENCOUNTER FOR SCREENING MAMMOGRAM FOR MALIGNANT NEOPLASM OF BREAST: ICD-10-CM

## 2023-07-24 PROCEDURE — 77067 SCR MAMMO BI INCL CAD: CPT

## 2023-07-24 PROCEDURE — 77063 BREAST TOMOSYNTHESIS BI: CPT

## 2023-07-24 RX ORDER — CYANOCOBALAMIN 1000 UG/ML
1000 INJECTION, SOLUTION INTRAMUSCULAR; SUBCUTANEOUS ONCE
Status: CANCELLED | OUTPATIENT
Start: 2023-07-25 | End: 2023-07-25

## 2023-07-24 RX ORDER — SODIUM CHLORIDE 9 MG/ML
20 INJECTION, SOLUTION INTRAVENOUS ONCE
Status: CANCELLED | OUTPATIENT
Start: 2023-07-25

## 2023-07-25 ENCOUNTER — HOSPITAL ENCOUNTER (OUTPATIENT)
Dept: INFUSION CENTER | Facility: HOSPITAL | Age: 48
End: 2023-07-25
Attending: INTERNAL MEDICINE

## 2023-07-27 ENCOUNTER — HOSPITAL ENCOUNTER (OUTPATIENT)
Dept: INFUSION CENTER | Facility: HOSPITAL | Age: 48
End: 2023-07-27
Attending: INTERNAL MEDICINE
Payer: MEDICARE

## 2023-07-27 VITALS
HEART RATE: 70 BPM | DIASTOLIC BLOOD PRESSURE: 71 MMHG | OXYGEN SATURATION: 98 % | RESPIRATION RATE: 18 BRPM | TEMPERATURE: 98.3 F | SYSTOLIC BLOOD PRESSURE: 104 MMHG

## 2023-07-27 DIAGNOSIS — D50.8 OTHER IRON DEFICIENCY ANEMIAS: ICD-10-CM

## 2023-07-27 DIAGNOSIS — E53.8 B12 DEFICIENCY: Primary | ICD-10-CM

## 2023-07-27 PROCEDURE — 96365 THER/PROPH/DIAG IV INF INIT: CPT

## 2023-07-27 PROCEDURE — 96372 THER/PROPH/DIAG INJ SC/IM: CPT

## 2023-07-27 RX ORDER — SODIUM CHLORIDE 9 MG/ML
20 INJECTION, SOLUTION INTRAVENOUS ONCE
Status: CANCELLED | OUTPATIENT
Start: 2023-08-03

## 2023-07-27 RX ORDER — CYANOCOBALAMIN 1000 UG/ML
1000 INJECTION, SOLUTION INTRAMUSCULAR; SUBCUTANEOUS ONCE
Status: COMPLETED | OUTPATIENT
Start: 2023-07-27 | End: 2023-07-27

## 2023-07-27 RX ORDER — SODIUM CHLORIDE 9 MG/ML
20 INJECTION, SOLUTION INTRAVENOUS ONCE
Status: COMPLETED | OUTPATIENT
Start: 2023-07-27 | End: 2023-07-27

## 2023-07-27 RX ORDER — CYANOCOBALAMIN 1000 UG/ML
1000 INJECTION, SOLUTION INTRAMUSCULAR; SUBCUTANEOUS ONCE
Status: CANCELLED | OUTPATIENT
Start: 2023-08-03 | End: 2023-08-01

## 2023-07-27 RX ADMIN — SODIUM CHLORIDE 20 ML/HR: 0.9 INJECTION, SOLUTION INTRAVENOUS at 13:43

## 2023-07-27 RX ADMIN — CYANOCOBALAMIN 1000 MCG: 1000 INJECTION INTRAMUSCULAR; SUBCUTANEOUS at 13:46

## 2023-07-27 RX ADMIN — IRON SUCROSE 200 MG: 20 INJECTION, SOLUTION INTRAVENOUS at 13:46

## 2023-07-27 NOTE — PROGRESS NOTES
Pt tolerated venofer infusion and B12 injection to L deltoid without difficulty. No s/s reaction noted. Next appt confirmed and AVS provided. Left ambulatory in stable condition.

## 2023-08-03 ENCOUNTER — HOSPITAL ENCOUNTER (OUTPATIENT)
Dept: INFUSION CENTER | Facility: HOSPITAL | Age: 48
End: 2023-08-03
Attending: INTERNAL MEDICINE
Payer: MEDICARE

## 2023-08-03 VITALS
HEART RATE: 67 BPM | TEMPERATURE: 98.1 F | SYSTOLIC BLOOD PRESSURE: 98 MMHG | OXYGEN SATURATION: 99 % | RESPIRATION RATE: 18 BRPM | DIASTOLIC BLOOD PRESSURE: 51 MMHG

## 2023-08-03 DIAGNOSIS — E53.8 B12 DEFICIENCY: Primary | ICD-10-CM

## 2023-08-03 DIAGNOSIS — D50.8 OTHER IRON DEFICIENCY ANEMIAS: ICD-10-CM

## 2023-08-03 PROCEDURE — 96365 THER/PROPH/DIAG IV INF INIT: CPT

## 2023-08-03 PROCEDURE — 96372 THER/PROPH/DIAG INJ SC/IM: CPT

## 2023-08-03 RX ORDER — SODIUM CHLORIDE 9 MG/ML
20 INJECTION, SOLUTION INTRAVENOUS ONCE
Status: CANCELLED | OUTPATIENT
Start: 2023-08-03

## 2023-08-03 RX ORDER — CYANOCOBALAMIN 1000 UG/ML
1000 INJECTION, SOLUTION INTRAMUSCULAR; SUBCUTANEOUS ONCE
Status: COMPLETED | OUTPATIENT
Start: 2023-08-03 | End: 2023-08-03

## 2023-08-03 RX ORDER — SODIUM CHLORIDE 9 MG/ML
20 INJECTION, SOLUTION INTRAVENOUS ONCE
Status: COMPLETED | OUTPATIENT
Start: 2023-08-03 | End: 2023-08-03

## 2023-08-03 RX ORDER — CYANOCOBALAMIN 1000 UG/ML
1000 INJECTION, SOLUTION INTRAMUSCULAR; SUBCUTANEOUS ONCE
Status: CANCELLED | OUTPATIENT
Start: 2023-08-03 | End: 2023-08-03

## 2023-08-03 RX ADMIN — SODIUM CHLORIDE 20 ML/HR: 0.9 INJECTION, SOLUTION INTRAVENOUS at 13:24

## 2023-08-03 RX ADMIN — CYANOCOBALAMIN 1000 MCG: 1000 INJECTION INTRAMUSCULAR; SUBCUTANEOUS at 13:27

## 2023-08-03 RX ADMIN — IRON SUCROSE 200 MG: 20 INJECTION, SOLUTION INTRAVENOUS at 13:26

## 2023-08-03 NOTE — PROGRESS NOTES
B12 injection administered in L deltoid. Patient tolerated treatment infusion well with no adverse effects. Offers no complaints. AVS declined.

## 2023-08-03 NOTE — PLAN OF CARE
Problem: Potential for Falls  Goal: Patient will remain free of falls  Description: INTERVENTIONS:  - Educate patient/family on patient safety including physical limitations  - Instruct patient to call for assistance with activity   - Consult OT/PT to assist with strengthening/mobility   - Keep Call bell within reach    Outcome: Progressing     Problem: Knowledge Deficit  Goal: Patient/family/caregiver demonstrates understanding of disease process, treatment plan, medications, and discharge instructions  Description: Complete learning assessment and assess knowledge base.   Interventions:  - Provide teaching at level of understanding  - Provide teaching via preferred learning methods  Outcome: Progressing

## 2023-09-21 ENCOUNTER — TELEPHONE (OUTPATIENT)
Dept: OBGYN CLINIC | Facility: CLINIC | Age: 48
End: 2023-09-21

## 2023-09-21 NOTE — TELEPHONE ENCOUNTER
----- Message from Davis Parekh MD sent at 9/20/2023  5:49 PM EDT -----  Regarding: EMB before surgery  Please encourage this patient to keep her next scheduled EMB appointment. If she does not get this done her surgery will be cancelled. Thanks! Marzena Alonso MD  PGY-IV, OB/GYN  9/20/2023, 5:50 PM

## 2023-09-21 NOTE — TELEPHONE ENCOUNTER
Called PT and confirmed her appointment for 10/12/23 for EMB and PT stated she will come to her appointment.

## 2023-09-27 ENCOUNTER — TELEPHONE (OUTPATIENT)
Dept: OBGYN CLINIC | Facility: CLINIC | Age: 48
End: 2023-09-27

## 2023-09-27 NOTE — TELEPHONE ENCOUNTER
----- Message from Luis A Bernabe MD sent at 9/26/2023  2:30 PM EDT -----  Regarding: labwork  Please call the patient to have her complete labwork in next week or so - need to see if she needs more iron infusions prior to her surgery next month

## 2023-10-02 ENCOUNTER — APPOINTMENT (OUTPATIENT)
Dept: LAB | Facility: HOSPITAL | Age: 48
End: 2023-10-02
Payer: MEDICARE

## 2023-10-02 DIAGNOSIS — D56.3 BETA THALASSEMIA MINOR: ICD-10-CM

## 2023-10-02 DIAGNOSIS — E53.8 B12 DEFICIENCY: ICD-10-CM

## 2023-10-02 DIAGNOSIS — D50.8 OTHER IRON DEFICIENCY ANEMIA: ICD-10-CM

## 2023-10-02 DIAGNOSIS — D70.8 OTHER NEUTROPENIA (HCC): ICD-10-CM

## 2023-10-02 LAB
ALBUMIN SERPL BCP-MCNC: 4.4 G/DL (ref 3.5–5)
ALP SERPL-CCNC: 46 U/L (ref 34–104)
ALT SERPL W P-5'-P-CCNC: 8 U/L (ref 7–52)
ANION GAP SERPL CALCULATED.3IONS-SCNC: 7 MMOL/L
AST SERPL W P-5'-P-CCNC: 11 U/L (ref 13–39)
B BURGDOR IGG+IGM SER QL IA: NEGATIVE
BASOPHILS NFR BLD MANUAL: 0 % (ref 0–1)
BILIRUB SERPL-MCNC: 0.55 MG/DL (ref 0.2–1)
BUN SERPL-MCNC: 6 MG/DL (ref 5–25)
CALCIUM SERPL-MCNC: 9.5 MG/DL (ref 8.4–10.2)
CHLORIDE SERPL-SCNC: 103 MMOL/L (ref 96–108)
CO2 SERPL-SCNC: 28 MMOL/L (ref 21–32)
CREAT SERPL-MCNC: 0.58 MG/DL (ref 0.6–1.3)
CRP SERPL QL: <1 MG/L
ERYTHROCYTE [DISTWIDTH] IN BLOOD BY AUTOMATED COUNT: 16.3 % (ref 11.6–15.1)
ERYTHROCYTE [SEDIMENTATION RATE] IN BLOOD: 20 MM/HOUR (ref 0–29)
FERRITIN SERPL-MCNC: 11 NG/ML (ref 11–307)
GFR SERPL CREATININE-BSD FRML MDRD: 107 ML/MIN/1.73SQ M
GLUCOSE P FAST SERPL-MCNC: 90 MG/DL (ref 65–99)
HCT VFR BLD AUTO: 38.4 % (ref 34.8–46.1)
HGB BLD-MCNC: 11.4 G/DL (ref 11.5–15.4)
IMM EOSINOPHIL NFR BLD MANUAL: 0 % (ref 0–6)
IRON SATN MFR SERPL: 15 % (ref 15–50)
IRON SERPL-MCNC: 38 UG/DL (ref 50–212)
LDH SERPL-CCNC: 129 U/L (ref 140–271)
LYMPHOCYTES NFR BLD: 25 % (ref 14–44)
MACROCYTES BLD QL AUTO: PRESENT
MCH RBC QN AUTO: 21.7 PG (ref 26.8–34.3)
MCHC RBC AUTO-ENTMCNC: 29.7 G/DL (ref 31.4–37.4)
MCV RBC AUTO: 73 FL (ref 82–98)
MICROCYTES BLD QL AUTO: PRESENT
MONOCYTES NFR BLD AUTO: 7 % (ref 4–12)
NEUTS SEG NFR BLD AUTO: 68 % (ref 45–77)
NRBC BLD AUTO-RTO: 0 /100 WBCS
OVALOCYTES BLD QL SMEAR: PRESENT
PLATELET # BLD AUTO: 261 THOUSANDS/UL (ref 149–390)
PMV BLD AUTO: 10.5 FL (ref 8.9–12.7)
POIKILOCYTOSIS BLD QL SMEAR: PRESENT
POLYCHROMASIA BLD QL SMEAR: PRESENT
POTASSIUM SERPL-SCNC: 4.5 MMOL/L (ref 3.5–5.3)
PROT SERPL-MCNC: 7.4 G/DL (ref 6.4–8.4)
RBC # BLD AUTO: 5.25 MILLION/UL (ref 3.81–5.12)
RBC MORPH BLD: PRESENT
SODIUM SERPL-SCNC: 138 MMOL/L (ref 135–147)
SPHEROCYTES BLD QL SMEAR: PRESENT
TIBC SERPL-MCNC: 262 UG/DL (ref 250–450)
TOTAL CELLS COUNTED SPEC: 100
UIBC SERPL-MCNC: 224 UG/DL (ref 155–355)
VIT B12 SERPL-MCNC: 434 PG/ML (ref 180–914)
WBC # BLD AUTO: 3.24 THOUSAND/UL (ref 4.31–10.16)

## 2023-10-02 PROCEDURE — 88184 FLOWCYTOMETRY/ TC 1 MARKER: CPT

## 2023-10-02 PROCEDURE — 36415 COLL VENOUS BLD VENIPUNCTURE: CPT

## 2023-10-02 PROCEDURE — 83550 IRON BINDING TEST: CPT

## 2023-10-02 PROCEDURE — 86618 LYME DISEASE ANTIBODY: CPT

## 2023-10-02 PROCEDURE — 80053 COMPREHEN METABOLIC PANEL: CPT

## 2023-10-02 PROCEDURE — 84630 ASSAY OF ZINC: CPT

## 2023-10-02 PROCEDURE — 82607 VITAMIN B-12: CPT

## 2023-10-02 PROCEDURE — 86258 DGP ANTIBODY EACH IG CLASS: CPT

## 2023-10-02 PROCEDURE — 85652 RBC SED RATE AUTOMATED: CPT

## 2023-10-02 PROCEDURE — 86364 TISS TRNSGLTMNASE EA IG CLAS: CPT

## 2023-10-02 PROCEDURE — 83615 LACTATE (LD) (LDH) ENZYME: CPT

## 2023-10-02 PROCEDURE — 86140 C-REACTIVE PROTEIN: CPT

## 2023-10-02 PROCEDURE — 88185 FLOWCYTOMETRY/TC ADD-ON: CPT

## 2023-10-02 PROCEDURE — 82525 ASSAY OF COPPER: CPT

## 2023-10-02 PROCEDURE — 83540 ASSAY OF IRON: CPT

## 2023-10-02 PROCEDURE — 82728 ASSAY OF FERRITIN: CPT

## 2023-10-02 PROCEDURE — 82784 ASSAY IGA/IGD/IGG/IGM EACH: CPT

## 2023-10-02 PROCEDURE — 85007 BL SMEAR W/DIFF WBC COUNT: CPT

## 2023-10-02 PROCEDURE — 86231 EMA EACH IG CLASS: CPT

## 2023-10-03 LAB
ENDOMYSIUM IGA SER QL: NEGATIVE
GLIADIN PEPTIDE IGA SER-ACNC: 6 UNITS (ref 0–19)
GLIADIN PEPTIDE IGG SER-ACNC: 2 UNITS (ref 0–19)
IGA SERPL-MCNC: 239 MG/DL (ref 87–352)
TTG IGA SER-ACNC: 2 U/ML (ref 0–3)
TTG IGG SER-ACNC: 7 U/ML (ref 0–5)

## 2023-10-05 LAB — SCAN RESULT: NORMAL

## 2023-10-10 LAB — COPPER SERPL-MCNC: 94 UG/DL (ref 80–158)

## 2023-10-11 LAB — ZINC SERPL-MCNC: 65 UG/DL (ref 44–115)

## 2023-10-17 ENCOUNTER — PROCEDURE VISIT (OUTPATIENT)
Dept: OBGYN CLINIC | Facility: CLINIC | Age: 48
End: 2023-10-17

## 2023-10-17 ENCOUNTER — TELEPHONE (OUTPATIENT)
Dept: OBGYN CLINIC | Facility: CLINIC | Age: 48
End: 2023-10-17

## 2023-10-17 VITALS
WEIGHT: 178.8 LBS | SYSTOLIC BLOOD PRESSURE: 111 MMHG | HEART RATE: 70 BPM | DIASTOLIC BLOOD PRESSURE: 72 MMHG | BODY MASS INDEX: 24.94 KG/M2

## 2023-10-17 DIAGNOSIS — D25.1 INTRAMURAL UTERINE FIBROID: Primary | Chronic | ICD-10-CM

## 2023-10-17 DIAGNOSIS — R10.2 PELVIC PAIN: ICD-10-CM

## 2023-10-17 PROBLEM — Z11.4 ENCOUNTER FOR SCREENING FOR HIV: Status: RESOLVED | Noted: 2019-11-25 | Resolved: 2023-10-17

## 2023-10-17 NOTE — PROGRESS NOTES
H&P Exam - Gynecology  Lydian Manual 46 y.o. female MRN: [id number]  Unit/Bed#:  Encounter: [id number]    Assessment/Plan     Pelvic pain and AUB:  NPO after midnight preceding procedure  Instructions given for surgical soap and carbohydrate drink  Proceed with surgery as scheduled PENDING EMB results (EMB scheduled for 10/19)    HPI:     [id number] utilized through [employer] [facility]    Selena Hartman is a 46 y.o. female who presents for pre-surgical H&P. She is scheduled to undergo total laparoscopic hysterectomy and bilateral salpingectomy on 10/30/23 for abnormal uterine bleeding causing anemia requiring IV iron infusions, pelvic pain, and fibroid uterus. She has not yet undergone her EMB. We offered to do this today, but she declines as she "was not prepared for that" and plans to return for this at her appointment already scheduled for 10/19/23. She was informed that if this is not completed, her surgery will be delayed. Previously, alternatives including medical management were reviewed, and patient opted for definitive management via hysterectomy which she confirms is still her preference today. Her TSH was elevated on 4/7/23, and her levothyroxine dose was increased by her PCP at that time. Her Hgb was 11.4 on 10/2/23. We reviewed the risks of the procedure including bleeding, infection, VTE, damage to local organs (e.g., bladder, bowel, ureters, ovaries, vasculature, lymphatics, nerves), possible bilateral oophorectomy, bleeding requiring transfusion, need for conversion to open procedure, need for additional procedures. Surgical consent was signed on 5/24/23. MA-30 was signed today. Surgical wash and carbohydrate drink were provided with instructions. Recovery and post-op follow up was discussed and expectations reviewed.     Accepts blood products if indicated: yes  Accepts all life-saving measures including intubation & resuscitation with compressions: yes    Review of Systems   Constitutional:  Negative for chills and fever. Eyes:  Negative for visual disturbance. Respiratory:  Negative for cough and shortness of breath. Cardiovascular:  Negative for chest pain and leg swelling. Gastrointestinal:  Negative for abdominal pain, diarrhea, nausea and vomiting. Genitourinary:  Negative for dysuria, frequency, hematuria, pelvic pain, urgency, vaginal bleeding and vaginal discharge. Neurological:  Negative for dizziness, light-headedness and headaches.        Historical Information   Past Medical History:   Diagnosis Date    Fibroid 2017    small fibroids noted    Hypothyroid      Past Surgical History:   Procedure Laterality Date     SECTION  ,      OB History    Para Term  AB Living   5 2 2 0 3 2   SAB IAB Ectopic Multiple Live Births   2 1 0 0 2      # Outcome Date GA Lbr Graeme/2nd Weight Sex Delivery Anes PTL Lv   5 SAB            4 SAB            3 IAB            2 Term            1 Term              Family History   Problem Relation Age of Onset    No Known Problems Mother     No Known Problems Father     No Known Problems Sister     No Known Problems Daughter     No Known Problems Maternal Grandmother     No Known Problems Maternal Grandfather     No Known Problems Paternal Grandmother     No Known Problems Paternal Grandfather     Breast cancer Neg Hx     Cancer Neg Hx      Social History   Social History     Substance and Sexual Activity   Alcohol Use Yes    Comment: rare     Social History     Substance and Sexual Activity   Drug Use Never     Social History     Tobacco Use   Smoking Status Never   Smokeless Tobacco Never       Meds/Allergies   (Not in a hospital admission)    No Known Allergies    Objective     Vitals:  /72   Pulse 70   Wt 81.1 kg (178 lb 12.8 oz)   LMP 10/15/2023 (Exact Date)   BMI 24.94 kg/m²     Lab Results   Component Value Date    WBC 3.24 (L) 10/02/2023    HGB 11.4 (L) 10/02/2023    HCT 38.4 10/02/2023 MCV 73 (L) 10/02/2023     10/02/2023       Lab Results   Component Value Date    GLUCOSE 71 03/29/2018    CALCIUM 9.5 10/02/2023     03/29/2018    K 4.5 10/02/2023    CO2 28 10/02/2023     10/02/2023    BUN 6 10/02/2023    CREATININE 0.58 (L) 10/02/2023       Physical Exam  Constitutional:       General: She is not in acute distress. Appearance: Normal appearance. She is not ill-appearing. HENT:      Mouth/Throat:      Mouth: Mucous membranes are moist.   Eyes:      Extraocular Movements: Extraocular movements intact. Cardiovascular:      Rate and Rhythm: Normal rate and regular rhythm. Heart sounds: Normal heart sounds. Pulmonary:      Effort: Pulmonary effort is normal.      Breath sounds: Normal breath sounds. Abdominal:      General: There is no distension. Palpations: Abdomen is soft. Tenderness: There is no abdominal tenderness. There is no guarding. Musculoskeletal:         General: No swelling or tenderness. Right lower leg: No edema. Left lower leg: No edema. Skin:     General: Skin is warm and dry. Coloration: Skin is not jaundiced or pale. Neurological:      General: No focal deficit present. Mental Status: She is alert. Psychiatric:         Mood and Affect: Mood normal.         Behavior: Behavior normal.         Thought Content: Thought content normal.         Judgment: Judgment normal.     Imaging: I have personally reviewed pertinent reports. PELVIC ULTRASOUND, COMPLETE 4/24/23 IMPRESSION:  Multiple exophytic uterine fibroids, at least 1 of which has increased in size. EKG, Pathology, and Other Studies: I have personally reviewed pertinent reports.           Karen Gil MD  10/17/2023  12:21 PM

## 2023-10-19 ENCOUNTER — TELEPHONE (OUTPATIENT)
Dept: OBGYN CLINIC | Facility: CLINIC | Age: 48
End: 2023-10-19

## 2023-10-24 NOTE — TELEPHONE ENCOUNTER
Patients surgery for 10/30/2023 will be canceled. Pt was advised that she needs an EMB before surgery and has been scheduled 2 times and patient was a no show both times. She was advised again and appt was given for EMB with  11/1/2023 and surgery coordinator will be notified after testing is done. At that time we can schedule her surgery.

## 2023-11-01 ENCOUNTER — PROCEDURE VISIT (OUTPATIENT)
Dept: OBGYN CLINIC | Facility: CLINIC | Age: 48
End: 2023-11-01

## 2023-11-01 VITALS
BODY MASS INDEX: 25.26 KG/M2 | HEART RATE: 65 BPM | DIASTOLIC BLOOD PRESSURE: 75 MMHG | SYSTOLIC BLOOD PRESSURE: 118 MMHG | HEIGHT: 71 IN | WEIGHT: 180.4 LBS

## 2023-11-01 DIAGNOSIS — N93.9 ABNORMAL UTERINE BLEEDING (AUB): Primary | ICD-10-CM

## 2023-11-01 PROCEDURE — 58100 BIOPSY OF UTERUS LINING: CPT | Performed by: OBSTETRICS & GYNECOLOGY

## 2023-11-01 PROCEDURE — 88305 TISSUE EXAM BY PATHOLOGIST: CPT | Performed by: PATHOLOGY

## 2023-11-01 NOTE — PROGRESS NOTES
Endometrial biopsy    Date/Time: 11/1/2023 4:17 PM    Performed by: Alejandra Richmond MD  Authorized by: Alejandra Richmond MD  Universal Protocol:  Procedure performed by: (Juancho Willoughby)  Consent: Verbal consent obtained. Written consent obtained. Risks and benefits: risks, benefits and alternatives were discussed  Consent given by: patient  Patient understanding: patient states understanding of the procedure being performed  Patient consent: the patient's understanding of the procedure matches consent given  Procedure consent: procedure consent matches procedure scheduled  Relevant documents: relevant documents present and verified  Test results: test results available and properly labeled  Site marked: the operative site was not marked  Required items: required blood products, implants, devices, and special equipment available  Patient identity confirmed: verbally with patient    Indication:     Indications:  Other disorder of menstruation and other abnormal bleeding from female genital tract    Procedure:     Procedure: endometrial biopsy with Pipelle      A bivalve speculum was placed in the vagina: yes      Cervix cleaned and prepped: yes      A paracervical block was performed: no      An intracervical block was performed: no      The cervix was dilated: no      Uterus sounded: yes      Uterus sound depth (cm):  9    Specimen collected: specimen collected and sent to pathology      Patient tolerated procedure well with no complications: yes    Findings:     Cervix: normal

## 2023-11-06 PROCEDURE — 88305 TISSUE EXAM BY PATHOLOGIST: CPT | Performed by: PATHOLOGY

## 2023-11-30 ENCOUNTER — TELEPHONE (OUTPATIENT)
Dept: OBGYN CLINIC | Facility: CLINIC | Age: 48
End: 2023-11-30

## 2024-01-04 ENCOUNTER — OFFICE VISIT (OUTPATIENT)
Dept: OBGYN CLINIC | Facility: CLINIC | Age: 49
End: 2024-01-04

## 2024-01-04 VITALS
BODY MASS INDEX: 25.33 KG/M2 | SYSTOLIC BLOOD PRESSURE: 118 MMHG | WEIGHT: 181.6 LBS | DIASTOLIC BLOOD PRESSURE: 76 MMHG | HEART RATE: 62 BPM

## 2024-01-04 DIAGNOSIS — R10.2 PELVIC PAIN: Primary | ICD-10-CM

## 2024-01-04 DIAGNOSIS — D25.1 INTRAMURAL UTERINE FIBROID: Chronic | ICD-10-CM

## 2024-01-04 PROCEDURE — 99213 OFFICE O/P EST LOW 20 MIN: CPT | Performed by: OBSTETRICS & GYNECOLOGY

## 2024-01-04 NOTE — PROGRESS NOTES
Subjective:     Neema Taylor is a 48 y.o.  female who presents for discussion of EMB results and hysterectomy. Discussed that results were benign, and it appears that patient has an endometrial polyp.     Patient continues to desire hysterectomy and salpingectomy due to years of pelvic pain. She declines medical management.     Objective:    Vitals: Blood pressure 118/76, pulse 62, weight 82.4 kg (181 lb 9.6 oz), last menstrual period 2024, not currently breastfeeding.Body mass index is 25.33 kg/m².    Physical Exam  Constitutional:       General: She is not in acute distress.     Appearance: Normal appearance. She is not ill-appearing.   Cardiovascular:      Rate and Rhythm: Normal rate.      Pulses: Normal pulses.   Pulmonary:      Effort: Pulmonary effort is normal. No respiratory distress.   Chest:      Chest wall: No tenderness.   Abdominal:      Palpations: Abdomen is soft.   Skin:     General: Skin is warm and dry.   Neurological:      Mental Status: She is alert.         Assessment/Plan:  Neema Taylor is a 48y  who presents for discussion of EMB results and signing of consent form for TLH, BS.     -Desires definitive management with total hysterectomy: we discussed risks, benefits, alternatives. We discussed risks including bleeding, infection, damage to surrounding organs (bowel, bladder), nerves and vessels, DVT/PE, very unlikely risk of death. Discussed minimal risks with blood transfusion to include transfusion reaction, TRALI, rare transmission of HIV/Hepatitis C. Discussed bilateral salpingectomy and benefit of decreased risk of ovarian cancer, cystoscopy. Discussed plan to leave ovaries in situ. Benefits include removal cervical dysplasia, decreased risk ovarian cancer. Patient wishes to proceed.   -Will plan to proceed with TLH/BS, cystoscopy. Consent form reviewed in detail with patient, signed by patient. MA-30 also signed.   - patient to return for pre-op H&P after  surgery scheduled.      Ankita Catalan MD  1/4/2024  5:17 PM

## 2024-04-23 ENCOUNTER — DOCUMENTATION (OUTPATIENT)
Dept: OBGYN CLINIC | Facility: CLINIC | Age: 49
End: 2024-04-23

## 2024-05-31 ENCOUNTER — TELEPHONE (OUTPATIENT)
Dept: FAMILY MEDICINE CLINIC | Facility: CLINIC | Age: 49
End: 2024-05-31

## 2024-05-31 NOTE — TELEPHONE ENCOUNTER
first attempt to contact patient. Patient when I called her she is already scheduled with dr birch for establish care and med refills for 6/18.

## 2024-06-18 ENCOUNTER — OFFICE VISIT (OUTPATIENT)
Dept: FAMILY MEDICINE CLINIC | Facility: CLINIC | Age: 49
End: 2024-06-18

## 2024-06-18 ENCOUNTER — APPOINTMENT (OUTPATIENT)
Dept: LAB | Facility: CLINIC | Age: 49
End: 2024-06-18
Payer: MEDICARE

## 2024-06-18 VITALS
HEART RATE: 64 BPM | TEMPERATURE: 97.8 F | BODY MASS INDEX: 25.48 KG/M2 | WEIGHT: 182 LBS | OXYGEN SATURATION: 99 % | HEIGHT: 71 IN | SYSTOLIC BLOOD PRESSURE: 92 MMHG | RESPIRATION RATE: 18 BRPM | DIASTOLIC BLOOD PRESSURE: 55 MMHG

## 2024-06-18 DIAGNOSIS — E03.9 HYPOTHYROIDISM, UNSPECIFIED TYPE: ICD-10-CM

## 2024-06-18 DIAGNOSIS — D50.0 IRON DEFICIENCY ANEMIA DUE TO CHRONIC BLOOD LOSS: ICD-10-CM

## 2024-06-18 DIAGNOSIS — D25.1 INTRAMURAL UTERINE FIBROID: Chronic | ICD-10-CM

## 2024-06-18 DIAGNOSIS — Z59.41 FOOD INSECURITY: ICD-10-CM

## 2024-06-18 DIAGNOSIS — Z00.00 ANNUAL PHYSICAL EXAM: ICD-10-CM

## 2024-06-18 DIAGNOSIS — D50.8 OTHER IRON DEFICIENCY ANEMIAS: ICD-10-CM

## 2024-06-18 DIAGNOSIS — Z59.819 HOUSING INSTABILITY: ICD-10-CM

## 2024-06-18 DIAGNOSIS — Z12.31 SCREENING MAMMOGRAM FOR BREAST CANCER: ICD-10-CM

## 2024-06-18 DIAGNOSIS — R10.11 RUQ ABDOMINAL PAIN: ICD-10-CM

## 2024-06-18 DIAGNOSIS — R10.11 RUQ ABDOMINAL PAIN: Primary | ICD-10-CM

## 2024-06-18 LAB
ALBUMIN SERPL BCG-MCNC: 4.3 G/DL (ref 3.5–5)
ALP SERPL-CCNC: 48 U/L (ref 34–104)
ALT SERPL W P-5'-P-CCNC: 11 U/L (ref 7–52)
ANION GAP SERPL CALCULATED.3IONS-SCNC: 8 MMOL/L (ref 4–13)
AST SERPL W P-5'-P-CCNC: 14 U/L (ref 13–39)
BASOPHILS # BLD AUTO: 0.03 THOUSANDS/ÂΜL (ref 0–0.1)
BASOPHILS NFR BLD AUTO: 1 % (ref 0–1)
BILIRUB SERPL-MCNC: 0.36 MG/DL (ref 0.2–1)
BUN SERPL-MCNC: 13 MG/DL (ref 5–25)
CALCIUM SERPL-MCNC: 9.4 MG/DL (ref 8.4–10.2)
CHLORIDE SERPL-SCNC: 105 MMOL/L (ref 96–108)
CO2 SERPL-SCNC: 26 MMOL/L (ref 21–32)
CREAT SERPL-MCNC: 0.53 MG/DL (ref 0.6–1.3)
EOSINOPHIL # BLD AUTO: 0.1 THOUSAND/ÂΜL (ref 0–0.61)
EOSINOPHIL NFR BLD AUTO: 3 % (ref 0–6)
ERYTHROCYTE [DISTWIDTH] IN BLOOD BY AUTOMATED COUNT: 16 % (ref 11.6–15.1)
FERRITIN SERPL-MCNC: 4 NG/ML (ref 11–307)
GFR SERPL CREATININE-BSD FRML MDRD: 111 ML/MIN/1.73SQ M
GLUCOSE P FAST SERPL-MCNC: 76 MG/DL (ref 65–99)
HCT VFR BLD AUTO: 34.3 % (ref 34.8–46.1)
HGB BLD-MCNC: 10.4 G/DL (ref 11.5–15.4)
IMM GRANULOCYTES # BLD AUTO: 0.01 THOUSAND/UL (ref 0–0.2)
IMM GRANULOCYTES NFR BLD AUTO: 0 % (ref 0–2)
IRON SATN MFR SERPL: 5 % (ref 15–50)
IRON SERPL-MCNC: 15 UG/DL (ref 50–212)
LYMPHOCYTES # BLD AUTO: 1.76 THOUSANDS/ÂΜL (ref 0.6–4.47)
LYMPHOCYTES NFR BLD AUTO: 47 % (ref 14–44)
MCH RBC QN AUTO: 22 PG (ref 26.8–34.3)
MCHC RBC AUTO-ENTMCNC: 30.3 G/DL (ref 31.4–37.4)
MCV RBC AUTO: 73 FL (ref 82–98)
MONOCYTES # BLD AUTO: 0.43 THOUSAND/ÂΜL (ref 0.17–1.22)
MONOCYTES NFR BLD AUTO: 12 % (ref 4–12)
NEUTROPHILS # BLD AUTO: 1.34 THOUSANDS/ÂΜL (ref 1.85–7.62)
NEUTS SEG NFR BLD AUTO: 37 % (ref 43–75)
NRBC BLD AUTO-RTO: 0 /100 WBCS
PLATELET # BLD AUTO: 262 THOUSANDS/UL (ref 149–390)
PMV BLD AUTO: 10.8 FL (ref 8.9–12.7)
POTASSIUM SERPL-SCNC: 4.5 MMOL/L (ref 3.5–5.3)
PROT SERPL-MCNC: 7.3 G/DL (ref 6.4–8.4)
RBC # BLD AUTO: 4.72 MILLION/UL (ref 3.81–5.12)
SODIUM SERPL-SCNC: 139 MMOL/L (ref 135–147)
TIBC SERPL-MCNC: 319 UG/DL (ref 250–450)
TSH SERPL DL<=0.05 MIU/L-ACNC: 22.73 UIU/ML (ref 0.45–4.5)
UIBC SERPL-MCNC: 304 UG/DL (ref 155–355)
WBC # BLD AUTO: 3.67 THOUSAND/UL (ref 4.31–10.16)

## 2024-06-18 PROCEDURE — 99214 OFFICE O/P EST MOD 30 MIN: CPT | Performed by: FAMILY MEDICINE

## 2024-06-18 PROCEDURE — 82728 ASSAY OF FERRITIN: CPT

## 2024-06-18 PROCEDURE — 80053 COMPREHEN METABOLIC PANEL: CPT

## 2024-06-18 PROCEDURE — 85025 COMPLETE CBC W/AUTO DIFF WBC: CPT

## 2024-06-18 PROCEDURE — 36415 COLL VENOUS BLD VENIPUNCTURE: CPT

## 2024-06-18 PROCEDURE — 83550 IRON BINDING TEST: CPT

## 2024-06-18 PROCEDURE — 99396 PREV VISIT EST AGE 40-64: CPT | Performed by: FAMILY MEDICINE

## 2024-06-18 PROCEDURE — 83540 ASSAY OF IRON: CPT

## 2024-06-18 PROCEDURE — 84443 ASSAY THYROID STIM HORMONE: CPT

## 2024-06-18 RX ORDER — LEVOTHYROXINE SODIUM 112 UG/1
112 TABLET ORAL
Qty: 90 TABLET | Refills: 1 | Status: SHIPPED | OUTPATIENT
Start: 2024-06-18

## 2024-06-18 SDOH — ECONOMIC STABILITY - FOOD INSECURITY: FOOD INSECURITY: Z59.41

## 2024-06-18 SDOH — ECONOMIC STABILITY - HOUSING INSECURITY: HOUSING INSTABILITY UNSPECIFIED: Z59.819

## 2024-06-18 NOTE — ASSESSMENT & PLAN NOTE
Chronic intermittent right upper quadrant pain shortly after eating.  Occasionally associated with nausea.  Patient reports history of gallstones.  Will check right upper quadrant ultrasound

## 2024-06-18 NOTE — PROGRESS NOTES
Adult Annual Physical  Name: Neema Taylor      : 1975      MRN: [id number]  Encounter Provider: Patric Johnson MD  Encounter Date: 2024   Encounter department: [facility]    Assessment & Plan   1. RUQ abdominal pain  Assessment & Plan:  Chronic intermittent right upper quadrant pain shortly after eating.  Occasionally associated with nausea.  Patient reports history of gallstones.  Will check right upper quadrant ultrasound  Orders:  -     US right upper quadrant; Future; Expected date: 2024  -     Comprehensive metabolic panel; Future  2. Annual physical exam  3. Intramural uterine fibroid  Assessment & Plan:  Scheduled for hysterectomy by GYN in a couple of weeks for chronic pelvic pain  4. Hypothyroidism, unspecified type  Assessment & Plan:  Continue levothyroxine 112mcg  -Recheck thyroid function  Orders:  -     levothyroxine 112 mcg tablet; Take 1 tablet (112 mcg total) by mouth daily in the early morning  5. Other iron deficiency anemias  Assessment & Plan:  History of abnormal uterine bleeding  Check CBC/iron panel  6. Iron deficiency anemia due to chronic blood loss  -     CBC and differential; Future  -     Iron Panel (Includes Ferritin, Iron Sat%, Iron, and TIBC); Future  7. Screening mammogram for breast cancer  -     Mammo screening bilateral w 3d & cad; Future; Expected date: 2024  8. Food insecurity  -     Ambulatory referral to social work care management program; Future; Expected date: 2024  9. Housing instability  -     Ambulatory referral to social work care management program; Future; Expected date: 2024  Immunizations and preventive care screenings were discussed with patient today. Appropriate education was printed on patient's after visit summary.    Counseling:  Exercise: the importance of regular exercise/physical activity was discussed. Recommend exercise 3-5 times per week for at least 30 minutes.      BMI Counseling: Body mass index is 25.38 kg/m². The BMI is above normal. Nutrition recommendations include decreasing portion sizes, encouraging healthy choices of fruits and vegetables, decreasing fast food intake, consuming healthier snacks, limiting drinks that contain sugar, moderation in carbohydrate intake and reducing intake of cholesterol. Exercise recommendations include moderate physical activity 150 minutes/week. Rationale for BMI follow-up plan is due to patient being overweight or obese.     Depression Screening and Follow-up Plan: Patient was screened for depression during today's encounter. They screened negative with a PHQ-2 score of 2.      Patient refuses immunizations today.  Counseled patient on importance of vaccinations.  History of Present Illness     Adult Annual Physical:  Patient presents for annual physical. [employer] interpretation service used for the visit    48-year-old female with a history of intrauterine fibroid who presents today for wellness visit.  She seems to be doing okay overall.  She is scheduled for hysterectomy surgery in a couple of weeks for chronic pelvic pain, endometrial polyps and uterine fibroids.  She also reports chronic right upper quadrant abdominal pain that has been ongoing for a few years.  Symptoms are also associated with intermittent nausea but no vomiting.  Symptoms is often triggered by food intake.  She reports that she has a history of gallstones.  .     Diet and Physical Activity:  - Diet/Nutrition: well balanced diet.  - Exercise: walking.    General Health:  - Sleep: sleeps poorly.  - Hearing: normal hearing bilateral ears.  - Vision: wears glasses.  - Dental: brushes teeth once daily.    /GYN Health:  - Follows with GYN: yes.   - Menopause: premenopausal.   - History of STDs: no    Review of Systems   Constitutional:  Negative for chills, diaphoresis, fatigue and fever.   Eyes:  Negative for visual disturbance.   Respiratory:  Negative  "for shortness of breath and wheezing.    Gastrointestinal:  Positive for abdominal pain. Negative for anal bleeding, blood in stool, constipation, diarrhea, nausea and vomiting.   Genitourinary:  Positive for menstrual problem and pelvic pain. Negative for dysuria, frequency and urgency.   Skin:  Negative for rash.   Neurological:  Negative for dizziness, seizures, syncope, light-headedness and headaches.   Psychiatric/Behavioral:  Positive for sleep disturbance. Negative for confusion.      Medical History Reviewed by provider this encounter:  Tobacco  Allergies  Meds  Problems  Med Hx  Surg Hx  Fam Hx         Objective     BP 92/55 (BP Location: Right arm, Patient Position: Sitting, Cuff Size: Standard)   Pulse 64   Temp 97.8 °F (36.6 °C) (Temporal)   Resp 18   Ht 5' 11\" (1.803 m)   Wt 82.6 kg (182 lb)   LMP 06/06/2024 (Exact Date)   SpO2 99%   BMI 25.38 kg/m²     Physical Exam  Constitutional:       General: She is not in acute distress.     Appearance: Normal appearance. She is well-developed. She is not ill-appearing, toxic-appearing or diaphoretic.   HENT:      Head: Normocephalic and atraumatic.      Right Ear: External ear normal.      Left Ear: External ear normal.      Nose: Nose normal.      Mouth/Throat:      Pharynx: No oropharyngeal exudate.   Eyes:      General: No scleral icterus.        Right eye: No discharge.         Left eye: No discharge.      Extraocular Movements: Extraocular movements intact.      Pupils: Pupils are equal, round, and reactive to light.   Cardiovascular:      Rate and Rhythm: Normal rate and regular rhythm.      Heart sounds: Normal heart sounds. No murmur heard.     No friction rub. No gallop.   Pulmonary:      Effort: Pulmonary effort is normal. No respiratory distress.      Breath sounds: Normal breath sounds. No stridor. No wheezing or rhonchi.   Abdominal:      General: Bowel sounds are normal. There is no distension.      Palpations: Abdomen is soft. There " is no mass.      Tenderness: There is no abdominal tenderness. There is no guarding. Negative signs include Brown's sign.      Hernia: No hernia is present.   Musculoskeletal:         General: Normal range of motion.      Cervical back: Normal range of motion.   Skin:     General: Skin is warm.      Capillary Refill: Capillary refill takes less than 2 seconds.   Neurological:      General: No focal deficit present.      Mental Status: She is alert and oriented to person, place, and time.      Cranial Nerves: No cranial nerve deficit.      Motor: No weakness.      Gait: Gait normal.       Administrative Statements

## 2024-06-21 ENCOUNTER — HOSPITAL ENCOUNTER (OUTPATIENT)
Dept: ULTRASOUND IMAGING | Facility: HOSPITAL | Age: 49
End: 2024-06-21
Attending: FAMILY MEDICINE
Payer: MEDICARE

## 2024-06-21 DIAGNOSIS — R10.11 RUQ ABDOMINAL PAIN: ICD-10-CM

## 2024-06-21 PROCEDURE — 76705 ECHO EXAM OF ABDOMEN: CPT

## 2024-06-24 DIAGNOSIS — D50.8 OTHER IRON DEFICIENCY ANEMIAS: Primary | ICD-10-CM

## 2024-06-24 RX ORDER — FERROUS SULFATE 324(65)MG
324 TABLET, DELAYED RELEASE (ENTERIC COATED) ORAL
Qty: 30 TABLET | Refills: 1 | Status: SHIPPED | OUTPATIENT
Start: 2024-06-24

## 2024-07-08 NOTE — H&P (VIEW-ONLY)
"[facility] ObGyn Preoperative H&P Exam  Neema Taylor 48 y.o. MRN: [id number]        Problem List Items Addressed This Visit       Other iron deficiency anemias     Continue oral iron every other day         Pelvic pain     For [facility] Bso with Dr. Nichole   EMB done - no malignancy  Surgical consent signed   NPO after midnight   Chlorhexidine wash and instructions for use given to patient  Ensure drink given  Postprocedure analgesia and expectations reviewed  Return 2 weeks post-procedure for postoperative check            Other Visit Diagnoses       Financial difficulties    -  Primary    Relevant Orders    Ambulatory referral to social work care management program    Housing instability        Relevant Orders    Ambulatory referral to social work care management program    Inadequate housing utilities        Relevant Orders    Ambulatory referral to social work care management program            History of Present Illness   Neema Taylor is a 48 y.o.  who presents for H&P for hysterectomy for chronic pelvic pain. She reports     Review of Systems   Constitutional: Negative.    Respiratory: Negative.     Cardiovascular: Negative.    Neurological: Negative.        Historical Information   Past Medical History:   Diagnosis Date    Exercise involving walking     5x/week    Fibroid 2017    small fibroids noted    History of anemia     \"had iron infusions-last time approx 2mths ago\"    Hypothyroid     Muscle pain     Uses Angolan as primary spoken language     Wears glasses     readers     Past Surgical History:   Procedure Laterality Date     SECTION  ,     COLONOSCOPY      DENTAL IMPLANT      ECTOPIC PREGNANCY SURGERY      x3    OTHER SURGICAL HISTORY      \"circlage to close cervix to continue pregnancy\"     OB History    Para Term  AB Living   5 2 2 0 3 2   SAB IAB Ectopic Multiple Live Births   2 1 0 0 2      # Outcome Date GA Lbr Graeme/2nd Weight Sex Type Anes " "PTL Lv   5 SAB            4 SAB            3 IAB            2 Term            1 Term              Family History   Problem Relation Age of Onset    No Known Problems Mother     No Known Problems Father     No Known Problems Sister     No Known Problems Maternal Grandmother     No Known Problems Maternal Grandfather     No Known Problems Paternal Grandmother     No Known Problems Paternal Grandfather     No Known Problems Daughter     Leukemia Niece/Nephew     Breast cancer Neg Hx     Cancer Neg Hx      Social History   Social History     Substance and Sexual Activity   Alcohol Use Yes    Comment: rare     Social History     Substance and Sexual Activity   Drug Use Never     Social History     Tobacco Use   Smoking Status Never    Passive exposure: Never   Smokeless Tobacco Never         Meds/Allergies   Not in a hospital admission.  No Known Allergies    Objective   /68 (BP Location: Right arm, Patient Position: Sitting)   Pulse 73   Ht 5' 11\" (1.803 m)   Wt 81.8 kg (180 lb 6.4 oz)   LMP 06/23/2024 (Exact Date)   BMI 25.16 kg/m²     Physical Exam  Vitals reviewed.   Constitutional:       Appearance: Normal appearance.   HENT:      Head: Normocephalic and atraumatic.   Cardiovascular:      Rate and Rhythm: Normal rate and regular rhythm.   Pulmonary:      Effort: Pulmonary effort is normal.      Breath sounds: Normal breath sounds.   Skin:     General: Skin is warm and dry.   Neurological:      General: No focal deficit present.      Mental Status: She is alert and oriented to person, place, and time.   Psychiatric:         Mood and Affect: Mood normal.         Behavior: Behavior normal.       Ankita Adams,    Obstetrics & Gynecology PGY-II  07/09/24  10:19 AM          "

## 2024-07-08 NOTE — PROGRESS NOTES
"[facility] ObGyn Preoperative H&P Exam  Neema Taylor 48 y.o. MRN: [id number]        Problem List Items Addressed This Visit       Other iron deficiency anemias     Continue oral iron every other day         Pelvic pain     For [facility] Bso with Dr. Nichole   EMB done - no malignancy  Surgical consent signed   NPO after midnight   Chlorhexidine wash and instructions for use given to patient  Ensure drink given  Postprocedure analgesia and expectations reviewed  Return 2 weeks post-procedure for postoperative check            Other Visit Diagnoses       Financial difficulties    -  Primary    Relevant Orders    Ambulatory referral to social work care management program    Housing instability        Relevant Orders    Ambulatory referral to social work care management program    Inadequate housing utilities        Relevant Orders    Ambulatory referral to social work care management program            History of Present Illness   Neema Taylor is a 48 y.o.  who presents for H&P for hysterectomy for chronic pelvic pain. She reports     Review of Systems   Constitutional: Negative.    Respiratory: Negative.     Cardiovascular: Negative.    Neurological: Negative.        Historical Information   Past Medical History:   Diagnosis Date    Exercise involving walking     5x/week    Fibroid 2017    small fibroids noted    History of anemia     \"had iron infusions-last time approx 2mths ago\"    Hypothyroid     Muscle pain     Uses Greek as primary spoken language     Wears glasses     readers     Past Surgical History:   Procedure Laterality Date     SECTION  ,     COLONOSCOPY      DENTAL IMPLANT      ECTOPIC PREGNANCY SURGERY      x3    OTHER SURGICAL HISTORY      \"circlage to close cervix to continue pregnancy\"     OB History    Para Term  AB Living   5 2 2 0 3 2   SAB IAB Ectopic Multiple Live Births   2 1 0 0 2      # Outcome Date GA Lbr Graeme/2nd Weight Sex Type Anes " "PTL Lv   5 SAB            4 SAB            3 IAB            2 Term            1 Term              Family History   Problem Relation Age of Onset    No Known Problems Mother     No Known Problems Father     No Known Problems Sister     No Known Problems Maternal Grandmother     No Known Problems Maternal Grandfather     No Known Problems Paternal Grandmother     No Known Problems Paternal Grandfather     No Known Problems Daughter     Leukemia Niece/Nephew     Breast cancer Neg Hx     Cancer Neg Hx      Social History   Social History     Substance and Sexual Activity   Alcohol Use Yes    Comment: rare     Social History     Substance and Sexual Activity   Drug Use Never     Social History     Tobacco Use   Smoking Status Never    Passive exposure: Never   Smokeless Tobacco Never         Meds/Allergies   Not in a hospital admission.  No Known Allergies    Objective   /68 (BP Location: Right arm, Patient Position: Sitting)   Pulse 73   Ht 5' 11\" (1.803 m)   Wt 81.8 kg (180 lb 6.4 oz)   LMP 06/23/2024 (Exact Date)   BMI 25.16 kg/m²     Physical Exam  Vitals reviewed.   Constitutional:       Appearance: Normal appearance.   HENT:      Head: Normocephalic and atraumatic.   Cardiovascular:      Rate and Rhythm: Normal rate and regular rhythm.   Pulmonary:      Effort: Pulmonary effort is normal.      Breath sounds: Normal breath sounds.   Skin:     General: Skin is warm and dry.   Neurological:      General: No focal deficit present.      Mental Status: She is alert and oriented to person, place, and time.   Psychiatric:         Mood and Affect: Mood normal.         Behavior: Behavior normal.       Ankita Adams,    Obstetrics & Gynecology PGY-II  07/09/24  10:19 AM          "

## 2024-07-09 ENCOUNTER — OFFICE VISIT (OUTPATIENT)
Dept: OBGYN CLINIC | Facility: CLINIC | Age: 49
End: 2024-07-09

## 2024-07-09 VITALS
BODY MASS INDEX: 25.26 KG/M2 | WEIGHT: 180.4 LBS | HEART RATE: 73 BPM | SYSTOLIC BLOOD PRESSURE: 112 MMHG | DIASTOLIC BLOOD PRESSURE: 68 MMHG | HEIGHT: 71 IN

## 2024-07-09 DIAGNOSIS — Z59.819 HOUSING INSTABILITY: ICD-10-CM

## 2024-07-09 DIAGNOSIS — D50.8 OTHER IRON DEFICIENCY ANEMIAS: ICD-10-CM

## 2024-07-09 DIAGNOSIS — Z59.12 INADEQUATE HOUSING UTILITIES: ICD-10-CM

## 2024-07-09 DIAGNOSIS — R10.2 PELVIC PAIN: ICD-10-CM

## 2024-07-09 DIAGNOSIS — Z59.9 FINANCIAL DIFFICULTIES: Primary | ICD-10-CM

## 2024-07-09 PROCEDURE — 99214 OFFICE O/P EST MOD 30 MIN: CPT | Performed by: OBSTETRICS & GYNECOLOGY

## 2024-07-09 SDOH — ECONOMIC STABILITY - HOUSING INSECURITY: INADEQUATE HOUSING UTILITIES: Z59.12

## 2024-07-09 SDOH — ECONOMIC STABILITY - HOUSING INSECURITY: HOUSING INSTABILITY UNSPECIFIED: Z59.819

## 2024-07-09 SDOH — ECONOMIC STABILITY - INCOME SECURITY: PROBLEM RELATED TO HOUSING AND ECONOMIC CIRCUMSTANCES, UNSPECIFIED: Z59.9

## 2024-07-09 NOTE — ASSESSMENT & PLAN NOTE
For [facility] Bso with Dr. Nichole 7/24  EMB done - no malignancy  Surgical consent signed   NPO after midnight   Chlorhexidine wash and instructions for use given to patient  Ensure drink given  Postprocedure analgesia and expectations reviewed  Return 2 weeks post-procedure for postoperative check

## 2024-07-09 NOTE — LETTER
July 9, 2024     Patient: Neema Taylor  YOB: 1975  Date of Visit: 7/9/2024      To Whom it May Concern:    Neema Taylor is under my professional care. Neema was seen in my office on 7/9/2024. Neema is having surgery on 7/24/24. Please excuse her for two weeks off work after the surgery.    If you have any questions or concerns, please don't hesitate to call.         Sincerely,          Ankita Adams, DO        CC: No Recipients

## 2024-07-11 ENCOUNTER — TELEPHONE (OUTPATIENT)
Dept: HEMATOLOGY ONCOLOGY | Facility: CLINIC | Age: 49
End: 2024-07-11

## 2024-07-17 ENCOUNTER — PATIENT OUTREACH (OUTPATIENT)
Dept: OBGYN CLINIC | Facility: CLINIC | Age: 49
End: 2024-07-17

## 2024-07-17 NOTE — PROGRESS NOTES
DANIELA MARI was referred by Dr. Adams for housing and utility concerns. DANIELA MARI attempted to outreach pt by phone today with the [employer] interpretor, there was no answer, DANIELA MARI left a  for a return call.

## 2024-07-23 ENCOUNTER — ANESTHESIA EVENT (OUTPATIENT)
Dept: PERIOP | Facility: HOSPITAL | Age: 49
End: 2024-07-23
Payer: MEDICARE

## 2024-07-24 ENCOUNTER — ANESTHESIA (OUTPATIENT)
Dept: PERIOP | Facility: HOSPITAL | Age: 49
End: 2024-07-24
Payer: MEDICARE

## 2024-07-24 ENCOUNTER — HOSPITAL ENCOUNTER (OUTPATIENT)
Facility: HOSPITAL | Age: 49
Setting detail: OUTPATIENT SURGERY
Discharge: HOME | End: 2024-07-24
Attending: OBSTETRICS & GYNECOLOGY | Admitting: OBSTETRICS & GYNECOLOGY
Payer: MEDICARE

## 2024-07-24 VITALS
OXYGEN SATURATION: 100 % | SYSTOLIC BLOOD PRESSURE: 114 MMHG | HEART RATE: 61 BPM | RESPIRATION RATE: 16 BRPM | DIASTOLIC BLOOD PRESSURE: 64 MMHG | TEMPERATURE: 97.8 F | WEIGHT: 177.03 LBS | BODY MASS INDEX: 24.69 KG/M2

## 2024-07-24 DIAGNOSIS — R10.2 PELVIC PAIN IN FEMALE: ICD-10-CM

## 2024-07-24 DIAGNOSIS — N93.9 ABNORMAL UTERINE BLEEDING: ICD-10-CM

## 2024-07-24 DIAGNOSIS — D25.9 UTERINE LEIOMYOMA, UNSPECIFIED LOCATION: ICD-10-CM

## 2024-07-24 DIAGNOSIS — Z90.710 STATUS POST LAPAROSCOPIC HYSTERECTOMY: Primary | ICD-10-CM

## 2024-07-24 LAB
ABO GROUP BLD: NORMAL
ABO GROUP BLD: NORMAL
BLD GP AB SCN SERPL QL: NEGATIVE
EXT PREGNANCY TEST URINE: NEGATIVE
EXT. CONTROL: NORMAL
RH BLD: POSITIVE
RH BLD: POSITIVE
SPECIMEN EXPIRATION DATE: NORMAL

## 2024-07-24 PROCEDURE — 88307 TISSUE EXAM BY PATHOLOGIST: CPT | Performed by: PATHOLOGY

## 2024-07-24 PROCEDURE — 86850 RBC ANTIBODY SCREEN: CPT | Performed by: OBSTETRICS & GYNECOLOGY

## 2024-07-24 PROCEDURE — 81025 URINE PREGNANCY TEST: CPT | Performed by: ANESTHESIOLOGY

## 2024-07-24 PROCEDURE — 86901 BLOOD TYPING SEROLOGIC RH(D): CPT | Performed by: OBSTETRICS & GYNECOLOGY

## 2024-07-24 PROCEDURE — 86900 BLOOD TYPING SEROLOGIC ABO: CPT | Performed by: OBSTETRICS & GYNECOLOGY

## 2024-07-24 PROCEDURE — 58571 TLH W/T/O 250 G OR LESS: CPT | Performed by: OBSTETRICS & GYNECOLOGY

## 2024-07-24 RX ORDER — BUPIVACAINE HYDROCHLORIDE 5 MG/ML
INJECTION, SOLUTION EPIDURAL; INTRACAUDAL AS NEEDED
Status: DISCONTINUED | OUTPATIENT
Start: 2024-07-24 | End: 2024-07-24 | Stop reason: HOSPADM

## 2024-07-24 RX ORDER — FUROSEMIDE 10 MG/ML
INJECTION INTRAMUSCULAR; INTRAVENOUS AS NEEDED
Status: DISCONTINUED | OUTPATIENT
Start: 2024-07-24 | End: 2024-07-24

## 2024-07-24 RX ORDER — GLYCOPYRROLATE 0.2 MG/ML
INJECTION INTRAMUSCULAR; INTRAVENOUS AS NEEDED
Status: DISCONTINUED | OUTPATIENT
Start: 2024-07-24 | End: 2024-07-24

## 2024-07-24 RX ORDER — FENTANYL CITRATE/PF 50 MCG/ML
25 SYRINGE (ML) INJECTION
Status: DISCONTINUED | OUTPATIENT
Start: 2024-07-24 | End: 2024-07-24 | Stop reason: HOSPADM

## 2024-07-24 RX ORDER — ONDANSETRON 2 MG/ML
4 INJECTION INTRAMUSCULAR; INTRAVENOUS EVERY 6 HOURS PRN
Status: DISCONTINUED | OUTPATIENT
Start: 2024-07-24 | End: 2024-07-24 | Stop reason: HOSPADM

## 2024-07-24 RX ORDER — ACETAMINOPHEN 325 MG/1
975 TABLET ORAL EVERY 6 HOURS PRN
Status: DISCONTINUED | OUTPATIENT
Start: 2024-07-24 | End: 2024-07-24 | Stop reason: HOSPADM

## 2024-07-24 RX ORDER — IBUPROFEN 600 MG/1
600 TABLET ORAL EVERY 6 HOURS PRN
Status: DISCONTINUED | OUTPATIENT
Start: 2024-07-24 | End: 2024-07-24 | Stop reason: HOSPADM

## 2024-07-24 RX ORDER — HYDROMORPHONE HCL/PF 1 MG/ML
SYRINGE (ML) INJECTION AS NEEDED
Status: DISCONTINUED | OUTPATIENT
Start: 2024-07-24 | End: 2024-07-24

## 2024-07-24 RX ORDER — ROCURONIUM BROMIDE 10 MG/ML
INJECTION, SOLUTION INTRAVENOUS AS NEEDED
Status: DISCONTINUED | OUTPATIENT
Start: 2024-07-24 | End: 2024-07-24

## 2024-07-24 RX ORDER — OXYCODONE HYDROCHLORIDE 5 MG/1
5 TABLET ORAL EVERY 4 HOURS PRN
Status: DISCONTINUED | OUTPATIENT
Start: 2024-07-24 | End: 2024-07-24 | Stop reason: HOSPADM

## 2024-07-24 RX ORDER — EPHEDRINE SULFATE 50 MG/ML
INJECTION INTRAVENOUS AS NEEDED
Status: DISCONTINUED | OUTPATIENT
Start: 2024-07-24 | End: 2024-07-24

## 2024-07-24 RX ORDER — DEXAMETHASONE SODIUM PHOSPHATE 10 MG/ML
INJECTION, SOLUTION INTRAMUSCULAR; INTRAVENOUS AS NEEDED
Status: DISCONTINUED | OUTPATIENT
Start: 2024-07-24 | End: 2024-07-24

## 2024-07-24 RX ORDER — FENTANYL CITRATE 50 UG/ML
INJECTION, SOLUTION INTRAMUSCULAR; INTRAVENOUS AS NEEDED
Status: DISCONTINUED | OUTPATIENT
Start: 2024-07-24 | End: 2024-07-24

## 2024-07-24 RX ORDER — ALBUTEROL SULFATE 2.5 MG/3ML
2.5 SOLUTION RESPIRATORY (INHALATION) ONCE AS NEEDED
Status: DISCONTINUED | OUTPATIENT
Start: 2024-07-24 | End: 2024-07-24 | Stop reason: HOSPADM

## 2024-07-24 RX ORDER — ACETAMINOPHEN 325 MG/1
975 TABLET ORAL ONCE
Status: COMPLETED | OUTPATIENT
Start: 2024-07-24 | End: 2024-07-24

## 2024-07-24 RX ORDER — ONDANSETRON 2 MG/ML
INJECTION INTRAMUSCULAR; INTRAVENOUS AS NEEDED
Status: DISCONTINUED | OUTPATIENT
Start: 2024-07-24 | End: 2024-07-24

## 2024-07-24 RX ORDER — IBUPROFEN 600 MG/1
600 TABLET ORAL EVERY 6 HOURS PRN
Qty: 30 TABLET | Refills: 0 | Status: SHIPPED | OUTPATIENT
Start: 2024-07-24

## 2024-07-24 RX ORDER — ONDANSETRON 2 MG/ML
4 INJECTION INTRAMUSCULAR; INTRAVENOUS ONCE AS NEEDED
Status: DISCONTINUED | OUTPATIENT
Start: 2024-07-24 | End: 2024-07-24 | Stop reason: HOSPADM

## 2024-07-24 RX ORDER — ACETAMINOPHEN 500 MG
1000 TABLET ORAL EVERY 6 HOURS PRN
Qty: 30 TABLET | Refills: 0 | Status: SHIPPED | OUTPATIENT
Start: 2024-07-24

## 2024-07-24 RX ORDER — KETOROLAC TROMETHAMINE 30 MG/ML
INJECTION, SOLUTION INTRAMUSCULAR; INTRAVENOUS AS NEEDED
Status: DISCONTINUED | OUTPATIENT
Start: 2024-07-24 | End: 2024-07-24

## 2024-07-24 RX ORDER — SODIUM CHLORIDE, SODIUM LACTATE, POTASSIUM CHLORIDE, CALCIUM CHLORIDE 600; 310; 30; 20 MG/100ML; MG/100ML; MG/100ML; MG/100ML
INJECTION, SOLUTION INTRAVENOUS CONTINUOUS PRN
Status: DISCONTINUED | OUTPATIENT
Start: 2024-07-24 | End: 2024-07-24

## 2024-07-24 RX ORDER — SODIUM CHLORIDE, SODIUM LACTATE, POTASSIUM CHLORIDE, CALCIUM CHLORIDE 600; 310; 30; 20 MG/100ML; MG/100ML; MG/100ML; MG/100ML
125 INJECTION, SOLUTION INTRAVENOUS CONTINUOUS
Status: DISCONTINUED | OUTPATIENT
Start: 2024-07-24 | End: 2024-07-24 | Stop reason: HOSPADM

## 2024-07-24 RX ORDER — LIDOCAINE HYDROCHLORIDE 20 MG/ML
INJECTION, SOLUTION EPIDURAL; INFILTRATION; INTRACAUDAL; PERINEURAL AS NEEDED
Status: DISCONTINUED | OUTPATIENT
Start: 2024-07-24 | End: 2024-07-24

## 2024-07-24 RX ORDER — CEFAZOLIN SODIUM 2 G/50ML
2000 SOLUTION INTRAVENOUS ONCE
Status: DISCONTINUED | OUTPATIENT
Start: 2024-07-24 | End: 2024-07-24 | Stop reason: HOSPADM

## 2024-07-24 RX ORDER — HYDROMORPHONE HCL/PF 1 MG/ML
0.5 SYRINGE (ML) INJECTION
Status: DISCONTINUED | OUTPATIENT
Start: 2024-07-24 | End: 2024-07-24 | Stop reason: HOSPADM

## 2024-07-24 RX ORDER — PROPOFOL 10 MG/ML
INJECTION, EMULSION INTRAVENOUS AS NEEDED
Status: DISCONTINUED | OUTPATIENT
Start: 2024-07-24 | End: 2024-07-24

## 2024-07-24 RX ORDER — PROPOFOL 10 MG/ML
INJECTION, EMULSION INTRAVENOUS CONTINUOUS PRN
Status: DISCONTINUED | OUTPATIENT
Start: 2024-07-24 | End: 2024-07-24

## 2024-07-24 RX ORDER — MIDAZOLAM HYDROCHLORIDE 2 MG/2ML
INJECTION, SOLUTION INTRAMUSCULAR; INTRAVENOUS AS NEEDED
Status: DISCONTINUED | OUTPATIENT
Start: 2024-07-24 | End: 2024-07-24

## 2024-07-24 RX ORDER — OXYCODONE HYDROCHLORIDE 5 MG/1
5 TABLET ORAL EVERY 4 HOURS PRN
Qty: 10 TABLET | Refills: 0 | Status: SHIPPED | OUTPATIENT
Start: 2024-07-24 | End: 2024-08-03

## 2024-07-24 RX ORDER — CEFAZOLIN SODIUM 1 G/3ML
INJECTION, POWDER, FOR SOLUTION INTRAMUSCULAR; INTRAVENOUS AS NEEDED
Status: DISCONTINUED | OUTPATIENT
Start: 2024-07-24 | End: 2024-07-24

## 2024-07-24 RX ADMIN — FENTANYL CITRATE 100 MCG: 50 INJECTION INTRAMUSCULAR; INTRAVENOUS at 12:50

## 2024-07-24 RX ADMIN — CEFAZOLIN 2000 MG: 1 INJECTION, POWDER, FOR SOLUTION INTRAMUSCULAR; INTRAVENOUS; PARENTERAL at 12:56

## 2024-07-24 RX ADMIN — ROCURONIUM BROMIDE 50 MG: 10 INJECTION, SOLUTION INTRAVENOUS at 12:50

## 2024-07-24 RX ADMIN — SUGAMMADEX 200 MG: 100 INJECTION, SOLUTION INTRAVENOUS at 17:00

## 2024-07-24 RX ADMIN — PROPOFOL 200 MG: 10 INJECTION, EMULSION INTRAVENOUS at 12:50

## 2024-07-24 RX ADMIN — HYDROMORPHONE HYDROCHLORIDE 0.5 MG: 1 INJECTION, SOLUTION INTRAMUSCULAR; INTRAVENOUS; SUBCUTANEOUS at 15:51

## 2024-07-24 RX ADMIN — ACETAMINOPHEN 975 MG: 325 TABLET, FILM COATED ORAL at 10:34

## 2024-07-24 RX ADMIN — FUROSEMIDE 20 MG: 10 INJECTION, SOLUTION INTRAVENOUS at 16:57

## 2024-07-24 RX ADMIN — EPHEDRINE SULFATE 5 MG: 50 INJECTION, SOLUTION INTRAVENOUS at 12:57

## 2024-07-24 RX ADMIN — EPHEDRINE SULFATE 5 MG: 50 INJECTION, SOLUTION INTRAVENOUS at 15:09

## 2024-07-24 RX ADMIN — CEFAZOLIN 2000 MG: 1 INJECTION, POWDER, FOR SOLUTION INTRAMUSCULAR; INTRAVENOUS; PARENTERAL at 16:48

## 2024-07-24 RX ADMIN — SODIUM CHLORIDE, SODIUM LACTATE, POTASSIUM CHLORIDE, AND CALCIUM CHLORIDE 125 ML/HR: .6; .31; .03; .02 INJECTION, SOLUTION INTRAVENOUS at 10:52

## 2024-07-24 RX ADMIN — EPHEDRINE SULFATE 5 MG: 50 INJECTION, SOLUTION INTRAVENOUS at 13:22

## 2024-07-24 RX ADMIN — MIDAZOLAM HYDROCHLORIDE 2 MG: 1 INJECTION, SOLUTION INTRAMUSCULAR; INTRAVENOUS at 12:45

## 2024-07-24 RX ADMIN — KETOROLAC TROMETHAMINE 30 MG: 30 INJECTION, SOLUTION INTRAMUSCULAR; INTRAVENOUS at 16:49

## 2024-07-24 RX ADMIN — HYDROMORPHONE HYDROCHLORIDE 0.5 MG: 1 INJECTION, SOLUTION INTRAMUSCULAR; INTRAVENOUS; SUBCUTANEOUS at 13:00

## 2024-07-24 RX ADMIN — EPHEDRINE SULFATE 5 MG: 50 INJECTION, SOLUTION INTRAVENOUS at 16:19

## 2024-07-24 RX ADMIN — DEXAMETHASONE SODIUM PHOSPHATE 10 MG: 10 INJECTION INTRAMUSCULAR; INTRAVENOUS at 12:52

## 2024-07-24 RX ADMIN — ROCURONIUM BROMIDE 20 MG: 10 INJECTION, SOLUTION INTRAVENOUS at 15:16

## 2024-07-24 RX ADMIN — EPHEDRINE SULFATE 5 MG: 50 INJECTION, SOLUTION INTRAVENOUS at 14:53

## 2024-07-24 RX ADMIN — SODIUM CHLORIDE, SODIUM LACTATE, POTASSIUM CHLORIDE, AND CALCIUM CHLORIDE: .6; .31; .03; .02 INJECTION, SOLUTION INTRAVENOUS at 12:42

## 2024-07-24 RX ADMIN — EPHEDRINE SULFATE 5 MG: 50 INJECTION, SOLUTION INTRAVENOUS at 13:00

## 2024-07-24 RX ADMIN — SUGAMMADEX 200 MG: 100 INJECTION, SOLUTION INTRAVENOUS at 17:13

## 2024-07-24 RX ADMIN — LIDOCAINE HYDROCHLORIDE 100 MG: 20 INJECTION, SOLUTION EPIDURAL; INFILTRATION; INTRACAUDAL at 12:50

## 2024-07-24 RX ADMIN — GLYCOPYRROLATE 0.1 MCG: 0.2 INJECTION, SOLUTION INTRAMUSCULAR; INTRAVENOUS at 17:13

## 2024-07-24 RX ADMIN — ROCURONIUM BROMIDE 20 MG: 10 INJECTION, SOLUTION INTRAVENOUS at 13:47

## 2024-07-24 RX ADMIN — ONDANSETRON 4 MG: 2 INJECTION INTRAMUSCULAR; INTRAVENOUS at 16:45

## 2024-07-24 RX ADMIN — PROPOFOL 50 MCG/KG/MIN: 10 INJECTION, EMULSION INTRAVENOUS at 12:53

## 2024-07-24 RX ADMIN — SODIUM CHLORIDE, SODIUM LACTATE, POTASSIUM CHLORIDE, AND CALCIUM CHLORIDE: .6; .31; .03; .02 INJECTION, SOLUTION INTRAVENOUS at 15:51

## 2024-07-24 NOTE — OP NOTE
OPERATIVE REPORT  PATIENT NAME: Neema Taylor    :  1975  MRN: [id number]  Pt Location: [facility] OR ROOM 05    SURGERY DATE: 2024    Surgeons and Role:     * Bebeto Nichole MD - Primary     * Blayne Curry MD - Assisting    Preop Diagnosis:  Pelvic pain in female [R10.2]  Abnormal uterine bleeding [N93.9]  Uterine leiomyoma, unspecified location [D25.9]    Post-Op Diagnosis Codes:     * Pelvic pain in female [R10.2]     * Abnormal uterine bleeding [N93.9]     * Uterine leiomyoma, unspecified location [D25.9]    Procedure(s):  (LTH) WITH B/L SALPINGECTOMY.LYSIS OF ADHESIONS  CYSTO  (EUA)    Specimen(s):  ID Type Source Tests Collected by Time Destination   1 : UTERUS,BILATERAL FALLOPIAN TUBES,CERVIX AND UTERUN FIBROIDS X'S 2 Tissue Uterus TISSUE EXAM Bebeto Nichole MD 2024 [id number]        Estimated Blood Loss:   250 mL    Drains:  [REMOVED] Urethral Catheter Non-latex 16 Fr. (Removed)   Number of days: 0       Anesthesia Type:   General    Operative Indications:  Pelvic pain in female [R10.2]  Abnormal uterine bleeding [N93.9]  Uterine leiomyoma, unspecified location [D25.9]    Operative Findings:  Normal appearing external female genitalia  Severe adhesive disease with thickened adhesions from the anterior abdominal wall underlying the umbilicus and omentum spanning posteriorly to the uterus and covering the bladder  Large portion of case devoted to lysis of adhesions for appropriate mobilization and visualization of surgical anatomy  Uterus with three 1 cm fibroids along the left lateral, right lateral, and anterior surfaces  3 x 3 cm cervical fibroid appreciated requiring further dissection for lateralization of left uterine vessels and initiation of colpotomy  Specimen removed vaginally and sent to pathology  Vaginal cuff closed laparoscopically  Cystoscopy with bilateral urine jets appreciated from ureteral orifices with no apparent bladder mucosal injury      Complications:   None  apparent    Procedure and Technique:  Brief History    All risks, benefits, and alternatives to the procedure were discussed with the patient and she had the opportunity to ask questions. Informed consent was obtained.     Description of Procedure    Patient was taken to the operating room where a time out was performed to confirm correct patient and correct procedure. General endotracheal anesthesia (GET) was administered and the patient was positioned on the OR table in the dorsal lithotomy position. Arms were positioned in a neutral position at the sides and secured by tucking a draw sheet. All pressure points were padded and a marlen hugger was placed to maintain control of core body temperature. A bimanual exam was performed and the uterus was noted to be retroverted with appreciable 3 cm cervical fibroid along the posterior, left lateral aspect of the cervix. The patient was prepped and draped in the usual sterile fashion with chloroprep on the abdomen and chlorhexidine prep on the vagina and perineum.    Operative Technique    A weighted speculum was inserted into the vagina and a Roland retractor was used to visualize the anterior lip of the cervix, which was then grasped with a single toothed tenaculum. Under direct visualization the uterus was sounded to approximately 6 cm. Cervix was dilated for introduction of the SUZETTE uterine manipulator. SUZETTE was secured in place with a stitch of 0 Vicryl. Travis catheter was placed and the intravaginal occluder balloon was inflated. Sterile gloves were exchanged and attention was turned to the abdomen.    A Veress needle was inserted through the umbilicus for initiation of pneumoperitoneum. Opening pressure was noted to be <8 mmHg and pneumoperitoneum was then established to a maximal pressure of 15 mmHg. A 10 mm skin incision was made in the umbilicus with a scalpel for introduction of a 10mm trochar under direct visualization. Good intraperitoneal location was  confirmed. The patient was placed in Trendelenburg. Two additional 5mm trocars were placed in a similar fashion in the right and left lower quadrants, approximately 2cm superior and medial to the anterior superior iliac spine. Findings noted as above.    The ureters were clearly identified transperitoneally on both sides. Significant adhesiolysis was completed as described above utilizing blunt and electrocautery dissection with the Enseal device. Bilateral uteroovarian ligaments were cauterized and divided. The right fallopian tube was grasped at its fimbriated end with a blunt grasper and elevated to visualize the mesosalpinx. Enseal device was used to ligate along the mesosalpinx, working proximally and taking care to avoid ovarian vasculature. Attention was then turned to the contralateral tube, which was amputated in similar fashion. The round ligaments were cauterized and divided. The vesicouterine peritoneal reflection was divided with a mix of blunt and sharp dissection and the bladder was mobilized anteriorly. The uterine vessels were cauterized and divided bilaterally. The cardinal ligaments were cauterized and divided bilaterally down to the level of the manipulator cup. The above process was complicated by severe adhesive disease and noted cervical fibroid as described above requiring meticulous dissection for mobilization of the bladder serosal edge and initiation of the colpotomy. At this point, a circumferential colpotomy was made using electrocautery. The specimen was removed through the vagina.      The vaginal cuff was closed laparoscopically with 2-0 Stratafix in a running fashion along the length of the cuff followed by fixation with two throws in the reverse direction on completion of repair. The remainder of the cuff was closed with a separate running locked stitch. Airtight closure and excellent hemostasis was obtained. Copious irrigation was used and hemostasis was confirmed at low  intraperitoneal pressures. All trocars were removed and pneumoperitoneum was completely released with the assistance of Valsalva maneuvers. The skin at all port sites was closed using a subcuticular stitch of 4-0 Monocryl.    Vaginal exam revealed excellent closure and hemostasis. The Travis catheter was removed. The bladder was filled in retrograde fashion with approximately 300 mL of normal saline. Using the 5 mm cystoscope, cystoscopy was performed and the above-mentioned findings were noted. The bladder was emptied with a Travis catheter which was removed at the completion of the procedure.    At the conclusion of the procedure, all needle, sponge, and instrument counts were noted to be correct x2. Patient tolerated the procedure well and was transferred to PACU in stable condition.    Dr. Nichole was present and participated in all key portions of the case.         Patient Disposition:  PACU         SIGNATURE: Blayne Curry MD  DATE: July 24, 2024  TIME: 5:48 PM

## 2024-07-24 NOTE — INTERVAL H&P NOTE
H&P reviewed. After examining the patient I find no changes in the patients condition since the H&P had been written.    Vitals:    07/24/24 1040   BP: 103/54   Pulse: 64   Resp: 16   Temp: 97.7 °F (36.5 °C)   SpO2: 100%       Pt seen and evaluated and doing well   She is aware of the risk and the alternatives to the procedure and on board with the plan of care.     On call to the or

## 2024-07-24 NOTE — ANESTHESIA POSTPROCEDURE EVALUATION
Post-Op Assessment Note    CV Status:  Stable  Pain Score: 0    Pain management: adequate       Mental Status:  Awake and sleepy   Hydration Status:  Euvolemic   PONV Controlled:  Controlled   Airway Patency:  Patent     Post Op Vitals Reviewed: Yes    No anethesia notable event occurred.    Staff: CRNA               /56 (07/24/24 1721)    Temp 98.1 °F (36.7 °C) (07/24/24 1721)    Pulse 83 (07/24/24 1721)   Resp 12 (07/24/24 1721)    SpO2   100%

## 2024-07-24 NOTE — ANESTHESIA PREPROCEDURE EVALUATION
Procedure:  (LTH) WITH B/L SALPINGECTOMY (Abdomen)  CYSTO (Bladder)  (EUA) (Vagina )    Relevant Problems   ENDO   (+) Hypothyroidism      HEMATOLOGY   (+) Anemia   (+) Beta thalassemia minor   (+) Other iron deficiency anemias        Physical Exam    Airway    Mallampati score: II         Dental   No notable dental hx     Cardiovascular  Cardiovascular exam normal    Pulmonary  Pulmonary exam normal     Other Findings  post-pubertal.      Anesthesia Plan  ASA Score- 2     Anesthesia Type- general with ASA Monitors.         Additional Monitors:     Airway Plan: ETT.           Plan Factors-Exercise tolerance (METS): >4 METS.    Chart reviewed.        Patient is not a current smoker.  Patient instructed to abstain from smoking on day of procedure. Patient did not smoke on day of surgery.            Induction- intravenous.    Postoperative Plan- Plan for postoperative opioid use. Planned trial extubation        Informed Consent- Anesthetic plan and risks discussed with patient.

## 2024-07-26 ENCOUNTER — PATIENT OUTREACH (OUTPATIENT)
Dept: OBGYN CLINIC | Facility: CLINIC | Age: 49
End: 2024-07-26

## 2024-07-26 NOTE — PROGRESS NOTES
DANIELA MARI spoke with 47 y/o-S-P2- [place] speaking to address her needs. DANIELA MARI introduced self and discussed the reason for the call. Pt resides with 2 daughters. Pt is employed but her hours has been reduced. Pt verbalized financial issues. Currently her UGI is shut off. Pt is also on post op day 2 and will be out of work for several weeks.    Pt has GOGO BOSS. Pt was referred to [facility] via find help. Pt denies other needs at this time. DANIELA MARI provided contact information and encouraged Pt to call at any time needed.

## 2024-07-29 PROCEDURE — 88307 TISSUE EXAM BY PATHOLOGIST: CPT | Performed by: PATHOLOGY

## 2024-08-12 ENCOUNTER — PATIENT OUTREACH (OUTPATIENT)
Dept: FAMILY MEDICINE CLINIC | Facility: CLINIC | Age: 49
End: 2024-08-12

## 2024-08-12 NOTE — PROGRESS NOTES
DANIELA MARI received referral regarding positive SDOH/food insecurity/housing instability.     DANIELA MARI noted in chart that OB/GYN SW recently spoke with patient. Patient reported being without UGI. Patient has SNAP. Patient was referred to [facility].     DANIELA MARI placed call to the patient utilizing [employer] Welsh interpretor #[id number] and was unable to leave a message. 2 consecutive calls placed.

## 2024-08-16 ENCOUNTER — PATIENT OUTREACH (OUTPATIENT)
Dept: FAMILY MEDICINE CLINIC | Facility: CLINIC | Age: 49
End: 2024-08-16

## 2024-08-16 NOTE — PROGRESS NOTES
DANIELA MARI placed call to the patient utilizing [employer] interpretor #[id number] and left message. DANIELA MARI sent Unable to Reach letter via [employer] and will close referral due to no response. DANIELA MARI will remain available for psychosocial support as needed.

## 2024-08-16 NOTE — LETTER
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 Intentamos comunicarnos con usted por teléfono varias veces. Es importante que se comunique con nosotros al [phone] para que podamos ofrecerle ayuda con bob necesidades de atención médica.     Atentamente,         Marguerite Gilmore  [employer]

## 2024-09-03 ENCOUNTER — TELEPHONE (OUTPATIENT)
Dept: OBGYN CLINIC | Facility: CLINIC | Age: 49
End: 2024-09-03

## 2024-09-18 ENCOUNTER — OFFICE VISIT (OUTPATIENT)
Dept: DENTISTRY | Facility: CLINIC | Age: 49
End: 2024-09-18

## 2024-09-18 VITALS — HEART RATE: 69 BPM | SYSTOLIC BLOOD PRESSURE: 112 MMHG | DIASTOLIC BLOOD PRESSURE: 77 MMHG | TEMPERATURE: 97.7 F

## 2024-09-18 DIAGNOSIS — Z01.20 ENCOUNTER FOR DENTAL EXAMINATION: Primary | ICD-10-CM

## 2024-09-18 PROCEDURE — D0120 PERIODIC ORAL EVALUATION - ESTABLISHED PATIENT: HCPCS | Performed by: DENTIST

## 2024-09-18 PROCEDURE — D0274 BITEWINGS - 4 RADIOGRAPHIC IMAGES: HCPCS | Performed by: DENTAL HYGIENIST

## 2024-09-18 NOTE — DENTAL PROCEDURE DETAILS
PERIODIC EXAM,  4 BWX   REVIEWED MED HX: meds, allergies, health changes reviewed in [facility]. All consents signed.  CHIEF CONCERN: none  PAIN SCALE:  0  ASA CLASS:  ASA 2 - Patient with mild systemic disease with no functional limitations  PLAQUE:  moderate  CALCULUS: Moderate  BLEEDING:  moderate  STAIN : Light     PERIO: Mod gingivitis  ---FMP - 1-6 mm w/ BUP  ---Stage 1-2, Grade A  ---Recommended prophy and then re-eval probe readings in 6 months.  May need SRP's if readings don't improve    [employer] [place]an translation -  # [id number] - 10 min    Hygiene Procedures:  none    Oral Hygiene Instruction: Brushing minimum 2x daily for 2 minutes, daily flossing, Listerine, and Recommended soft toothbrush only    Dispensed:  none    Visual and Tactile Intraoral/ Extraoral evaluation: Oral and Oropharyngeal cancer evaluation. No findings     Dr. Belle   Reviewed with patient clinical and radiographic findings and patient verbalized understanding. All questions and concerns addressed.     REFERRALS: None    CARIES FINDINGS:   Watch areas as charted       TREATMENT  PLAN :   NV1:  Initial prophy - 50 min    Last BWX:   9/18/24  Last Panorex            FMX :  10/7/21

## 2024-10-08 ENCOUNTER — TELEPHONE (OUTPATIENT)
Dept: OBGYN CLINIC | Facility: CLINIC | Age: 49
End: 2024-10-08

## 2025-01-09 ENCOUNTER — OFFICE VISIT (OUTPATIENT)
Dept: FAMILY MEDICINE CLINIC | Facility: CLINIC | Age: 50
End: 2025-01-09

## 2025-01-09 VITALS
RESPIRATION RATE: 16 BRPM | SYSTOLIC BLOOD PRESSURE: 110 MMHG | HEART RATE: 63 BPM | TEMPERATURE: 97.6 F | WEIGHT: 173 LBS | HEIGHT: 71 IN | BODY MASS INDEX: 24.22 KG/M2 | OXYGEN SATURATION: 95 % | DIASTOLIC BLOOD PRESSURE: 60 MMHG

## 2025-01-09 DIAGNOSIS — M25.50 POLYARTHRALGIA: ICD-10-CM

## 2025-01-09 DIAGNOSIS — R53.83 OTHER FATIGUE: ICD-10-CM

## 2025-01-09 DIAGNOSIS — D50.8 OTHER IRON DEFICIENCY ANEMIAS: ICD-10-CM

## 2025-01-09 DIAGNOSIS — E03.8 OTHER SPECIFIED HYPOTHYROIDISM: Primary | ICD-10-CM

## 2025-01-09 DIAGNOSIS — Z12.31 ENCOUNTER FOR SCREENING MAMMOGRAM FOR BREAST CANCER: ICD-10-CM

## 2025-01-09 PROBLEM — R10.2 PELVIC PAIN: Status: RESOLVED | Noted: 2023-10-17 | Resolved: 2025-01-09

## 2025-01-09 PROBLEM — R10.11 RUQ ABDOMINAL PAIN: Status: RESOLVED | Noted: 2019-01-28 | Resolved: 2025-01-09

## 2025-01-09 PROCEDURE — 99214 OFFICE O/P EST MOD 30 MIN: CPT | Performed by: FAMILY MEDICINE

## 2025-01-09 RX ORDER — LEVOTHYROXINE SODIUM 112 UG/1
112 TABLET ORAL
Qty: 90 TABLET | Refills: 1 | Status: SHIPPED | OUTPATIENT
Start: 2025-01-09

## 2025-01-09 NOTE — ASSESSMENT & PLAN NOTE
Continue levothyroxine  Recheck thyroid function  Orders:    levothyroxine 112 mcg tablet; Take 1 tablet (112 mcg total) by mouth daily in the early morning    TSH, 3rd generation; Future    T4, free; Future

## 2025-01-09 NOTE — PROGRESS NOTES
Name: Neema Taylor      : 1975      MRN: [id number]  Encounter Provider: Patric Johnson MD  Encounter Date: 2025   Encounter department: [facility]  :  Assessment & Plan  Other specified hypothyroidism  Continue levothyroxine  Recheck thyroid function  Orders:    levothyroxine 112 mcg tablet; Take 1 tablet (112 mcg total) by mouth daily in the early morning    TSH, 3rd generation; Future    T4, free; Future    Other iron deficiency anemias  Continue taking iron rich food and multivitamins  Recheck CBC  Orders:    CBC and differential; Future    Comprehensive metabolic panel; Future    Other fatigue  Recommend that she starts taking multivitamins regularly  Recheck labs    Orders:    Vitamin D 25 hydroxy; Future    Vitamin B12; Future    Comprehensive metabolic panel; Future    Polyarthralgia  Patient reports a few months of intermittent joint stiffness and bilateral knee pain and hand pain   Will check for serologic markers for inflammatory arthritic conditions      Orders:    Sedimentation rate, automated; Future    C-reactive protein; Future    Vitamin D 25 hydroxy; Future    LEAH Screen w/Reflex [place]; Future    Comprehensive metabolic panel; Future    RF Screen w/ Reflex to [place]; Future    Cyclic citrul peptide antibody, IgG; Future    Encounter for screening mammogram for breast cancer    Orders:    Mammo screening bilateral w 3d and cad; Future           History of Present Illness     49-year-old female with a history of hypothyroidism and iron deficiency anemia who presents today for follow-up.  Patient reports a few months of intermittent bilateral hand and knee joint pain and stiffness.  She denies any recent injury.  She also reports persistent fatigue.  She has a history of anemia and B12 deficiency.      Review of Systems   Constitutional:  Positive for fatigue. Negative for appetite change, chills, diaphoresis, fever and unexpected weight  "change.   Respiratory:  Negative for shortness of breath and wheezing.    Cardiovascular:  Negative for chest pain, palpitations and leg swelling.   Gastrointestinal:  Negative for abdominal pain, diarrhea, nausea and vomiting.   Endocrine: Negative for polydipsia, polyphagia and polyuria.   Genitourinary:  Negative for dysuria, hematuria and urgency.   Musculoskeletal:  Positive for arthralgias. Negative for joint swelling.   Skin:  Negative for rash.   Neurological:  Negative for tremors, seizures, light-headedness and numbness.       Objective   /60 (BP Location: Right arm, Patient Position: Sitting, Cuff Size: Standard)   Pulse 63   Temp 97.6 °F (36.4 °C) (Temporal)   Resp 16   Ht 5' 11\" (1.803 m)   Wt 78.5 kg (173 lb)   SpO2 95%   BMI 24.13 kg/m²      Physical Exam  Constitutional:       General: She is not in acute distress.     Appearance: Normal appearance. She is well-developed. She is not ill-appearing, toxic-appearing or diaphoretic.   HENT:      Head: Normocephalic and atraumatic.      Right Ear: External ear normal.      Left Ear: External ear normal.      Nose: Nose normal.      Mouth/Throat:      Pharynx: No oropharyngeal exudate.   Eyes:      General: No scleral icterus.        Right eye: No discharge.         Left eye: No discharge.      Extraocular Movements: Extraocular movements intact.      Pupils: Pupils are equal, round, and reactive to light.   Cardiovascular:      Rate and Rhythm: Normal rate and regular rhythm.      Heart sounds: Normal heart sounds. No murmur heard.     No friction rub. No gallop.   Pulmonary:      Effort: Pulmonary effort is normal. No respiratory distress.      Breath sounds: Normal breath sounds. No stridor. No wheezing or rhonchi.   Abdominal:      General: Bowel sounds are normal. There is no distension.      Palpations: Abdomen is soft. There is no mass.      Tenderness: There is no abdominal tenderness. There is no guarding.   Musculoskeletal:         " General: No swelling, tenderness or deformity. Normal range of motion.      Cervical back: Normal range of motion.      Right lower leg: No edema.      Left lower leg: No edema.   Lymphadenopathy:      Cervical: No cervical adenopathy.   Skin:     General: Skin is warm.      Capillary Refill: Capillary refill takes less than 2 seconds.   Neurological:      General: No focal deficit present.      Mental Status: She is alert and oriented to person, place, and time.      Cranial Nerves: No cranial nerve deficit.      Motor: No weakness.      Gait: Gait normal.   Psychiatric:         Behavior: Behavior normal.

## 2025-01-09 NOTE — ASSESSMENT & PLAN NOTE
Continue taking iron rich food and multivitamins  Recheck CBC  Orders:    CBC and differential; Future    Comprehensive metabolic panel; Future

## 2025-01-16 ENCOUNTER — APPOINTMENT (OUTPATIENT)
Dept: LAB | Facility: HOSPITAL | Age: 50
End: 2025-01-16
Payer: MEDICARE

## 2025-01-16 DIAGNOSIS — R53.83 OTHER FATIGUE: ICD-10-CM

## 2025-01-16 DIAGNOSIS — M25.50 POLYARTHRALGIA: ICD-10-CM

## 2025-01-16 DIAGNOSIS — E03.8 OTHER SPECIFIED HYPOTHYROIDISM: ICD-10-CM

## 2025-01-16 DIAGNOSIS — D50.8 OTHER IRON DEFICIENCY ANEMIAS: ICD-10-CM

## 2025-01-16 LAB
25(OH)D3 SERPL-MCNC: 12.1 NG/ML (ref 30–100)
ALBUMIN SERPL BCG-MCNC: 4.3 G/DL (ref 3.5–5)
ALP SERPL-CCNC: 51 U/L (ref 34–104)
ALT SERPL W P-5'-P-CCNC: 10 U/L (ref 7–52)
ANION GAP SERPL CALCULATED.3IONS-SCNC: 5 MMOL/L (ref 4–13)
AST SERPL W P-5'-P-CCNC: 12 U/L (ref 13–39)
BASOPHILS # BLD AUTO: 0.02 THOUSANDS/ΜL (ref 0–0.1)
BASOPHILS NFR BLD AUTO: 1 % (ref 0–1)
BILIRUB SERPL-MCNC: 0.64 MG/DL (ref 0.2–1)
BUN SERPL-MCNC: 5 MG/DL (ref 5–25)
CALCIUM SERPL-MCNC: 9.3 MG/DL (ref 8.4–10.2)
CHLORIDE SERPL-SCNC: 104 MMOL/L (ref 96–108)
CO2 SERPL-SCNC: 28 MMOL/L (ref 21–32)
CREAT SERPL-MCNC: 0.5 MG/DL (ref 0.6–1.3)
CRP SERPL QL: <1 MG/L
EOSINOPHIL # BLD AUTO: 0.06 THOUSAND/ΜL (ref 0–0.61)
EOSINOPHIL NFR BLD AUTO: 2 % (ref 0–6)
ERYTHROCYTE [DISTWIDTH] IN BLOOD BY AUTOMATED COUNT: 16.7 % (ref 11.6–15.1)
ERYTHROCYTE [SEDIMENTATION RATE] IN BLOOD: 21 MM/HOUR (ref 0–29)
GFR SERPL CREATININE-BSD FRML MDRD: 112 ML/MIN/1.73SQ M
GLUCOSE P FAST SERPL-MCNC: 80 MG/DL (ref 65–99)
HCT VFR BLD AUTO: 36.5 % (ref 34.8–46.1)
HGB BLD-MCNC: 10.6 G/DL (ref 11.5–15.4)
IMM GRANULOCYTES # BLD AUTO: 0.01 THOUSAND/UL (ref 0–0.2)
IMM GRANULOCYTES NFR BLD AUTO: 0 % (ref 0–2)
LYMPHOCYTES # BLD AUTO: 1.69 THOUSANDS/ΜL (ref 0.6–4.47)
LYMPHOCYTES NFR BLD AUTO: 43 % (ref 14–44)
MCH RBC QN AUTO: 20.9 PG (ref 26.8–34.3)
MCHC RBC AUTO-ENTMCNC: 29 G/DL (ref 31.4–37.4)
MCV RBC AUTO: 72 FL (ref 82–98)
MONOCYTES # BLD AUTO: 0.48 THOUSAND/ΜL (ref 0.17–1.22)
MONOCYTES NFR BLD AUTO: 12 % (ref 4–12)
NEUTROPHILS # BLD AUTO: 1.65 THOUSANDS/ΜL (ref 1.85–7.62)
NEUTS SEG NFR BLD AUTO: 42 % (ref 43–75)
NRBC BLD AUTO-RTO: 0 /100 WBCS
PLATELET # BLD AUTO: 283 THOUSANDS/UL (ref 149–390)
PMV BLD AUTO: 9.9 FL (ref 8.9–12.7)
POTASSIUM SERPL-SCNC: 4.2 MMOL/L (ref 3.5–5.3)
PROT SERPL-MCNC: 7.3 G/DL (ref 6.4–8.4)
RBC # BLD AUTO: 5.06 MILLION/UL (ref 3.81–5.12)
SODIUM SERPL-SCNC: 137 MMOL/L (ref 135–147)
T4 FREE SERPL-MCNC: 0.87 NG/DL (ref 0.61–1.12)
TSH SERPL DL<=0.05 MIU/L-ACNC: 1.85 UIU/ML (ref 0.45–4.5)
VIT B12 SERPL-MCNC: 318 PG/ML (ref 180–914)
WBC # BLD AUTO: 3.91 THOUSAND/UL (ref 4.31–10.16)

## 2025-01-16 PROCEDURE — 84443 ASSAY THYROID STIM HORMONE: CPT

## 2025-01-16 PROCEDURE — 82306 VITAMIN D 25 HYDROXY: CPT

## 2025-01-16 PROCEDURE — 82607 VITAMIN B-12: CPT

## 2025-01-16 PROCEDURE — 86038 ANTINUCLEAR ANTIBODIES: CPT

## 2025-01-16 PROCEDURE — 80053 COMPREHEN METABOLIC PANEL: CPT

## 2025-01-16 PROCEDURE — 85652 RBC SED RATE AUTOMATED: CPT

## 2025-01-16 PROCEDURE — 86140 C-REACTIVE PROTEIN: CPT

## 2025-01-16 PROCEDURE — 86200 CCP ANTIBODY: CPT

## 2025-01-16 PROCEDURE — 85025 COMPLETE CBC W/AUTO DIFF WBC: CPT

## 2025-01-16 PROCEDURE — 86430 RHEUMATOID FACTOR TEST QUAL: CPT

## 2025-01-16 PROCEDURE — 86225 DNA ANTIBODY NATIVE: CPT

## 2025-01-16 PROCEDURE — 86431 RHEUMATOID FACTOR QUANT: CPT

## 2025-01-16 PROCEDURE — 84439 ASSAY OF FREE THYROXINE: CPT

## 2025-01-16 PROCEDURE — 36415 COLL VENOUS BLD VENIPUNCTURE: CPT

## 2025-01-17 LAB
CCP AB SER IA-ACNC: 1.7 (ref ?–10)
CRYOGLOB RF SER-ACNC: ABNORMAL [IU]/ML
DSDNA IGG SERPL IA-ACNC: 1.2 IU/ML (ref ?–15)
NUCLEAR IGG SER IA-RTO: 0.2 RATIO (ref ?–1)
RHEUMATOID FACT SER QL LA: POSITIVE

## 2025-01-23 ENCOUNTER — TELEPHONE (OUTPATIENT)
Dept: FAMILY MEDICINE CLINIC | Facility: CLINIC | Age: 50
End: 2025-01-23

## 2025-01-23 ENCOUNTER — RESULTS FOLLOW-UP (OUTPATIENT)
Dept: FAMILY MEDICINE CLINIC | Facility: CLINIC | Age: 50
End: 2025-01-23

## 2025-01-23 DIAGNOSIS — E55.9 VITAMIN D DEFICIENCY: Primary | ICD-10-CM

## 2025-01-23 DIAGNOSIS — D50.8 OTHER IRON DEFICIENCY ANEMIAS: ICD-10-CM

## 2025-01-23 RX ORDER — ERGOCALCIFEROL 1.25 MG/1
50000 CAPSULE, LIQUID FILLED ORAL WEEKLY
Qty: 12 CAPSULE | Refills: 0 | Status: SHIPPED | OUTPATIENT
Start: 2025-01-23

## 2025-01-23 RX ORDER — FERROUS SULFATE 324(65)MG
324 TABLET, DELAYED RELEASE (ENTERIC COATED) ORAL
Qty: 30 TABLET | Refills: 3 | Status: SHIPPED | OUTPATIENT
Start: 2025-01-23

## 2025-01-23 NOTE — TELEPHONE ENCOUNTER
Pt called regarding most recent lab results (1/16/25)     Informed that they were not reviewed yet. Pt concerned and requests a call back when they are ready.     Please Advise,   Thank you!

## 2025-02-20 ENCOUNTER — OFFICE VISIT (OUTPATIENT)
Dept: DENTISTRY | Facility: CLINIC | Age: 50
End: 2025-02-20

## 2025-02-20 VITALS — DIASTOLIC BLOOD PRESSURE: 75 MMHG | HEART RATE: 60 BPM | SYSTOLIC BLOOD PRESSURE: 116 MMHG | TEMPERATURE: 98 F

## 2025-02-20 DIAGNOSIS — K03.6 DENTAL CALCULUS: ICD-10-CM

## 2025-02-20 DIAGNOSIS — Z01.20 ENCOUNTER FOR DENTAL EXAMINATION: Primary | ICD-10-CM

## 2025-02-20 DIAGNOSIS — K03.6 ACCRETIONS ON TEETH: ICD-10-CM

## 2025-02-20 PROCEDURE — D1110 PROPHYLAXIS - ADULT: HCPCS

## 2025-02-20 PROCEDURE — D1330 ORAL HYGIENE INSTRUCTIONS: HCPCS

## 2025-02-20 NOTE — PROGRESS NOTES
ADULT PROPHY , (no xrays due )   REVIEWED MED HX: meds, allergies, health changes reviewed in [facility]. All consents signed.  CHIEF CONCERN: *sl sens. # 3 area  PAIN SCALE:  3  ASA CLASS:  II  PLAQUE:  mild  CALCULUS:  moderate- heavy SUB calc.  Tenacious molar regions  light supra calc    Mainly ultrasonic scaled   great patient to work on!!!!  BLEEDING:   light  STAIN :   light      PERIO: stage 2  grade A  re eval next pro    Hygiene Procedures:  Scaled, Polished, Flossed and Used Cavitron    Oral Hygiene Instruction: Brushing Minimum 2x daily for 2 minutes, daily flossing and Recommended soft toothbrush only    Dispensed: Toothbrush, Toothpaste, Floss and Flossers    Visual and Tactile Intraoral/ Extraoral evaluation: Oral and Oropharyngeal cancer evaluation. No findings   No exam today   will need exam next prophy    Next Recall: 6 month recall  ck for more sub    Last BWX: 9/18/2024  Last Panorex/ FMX : 10/2021

## 2025-05-07 ENCOUNTER — OFFICE VISIT (OUTPATIENT)
Dept: FAMILY MEDICINE CLINIC | Facility: CLINIC | Age: 50
End: 2025-05-07

## 2025-05-07 VITALS
DIASTOLIC BLOOD PRESSURE: 68 MMHG | SYSTOLIC BLOOD PRESSURE: 104 MMHG | HEIGHT: 71 IN | HEART RATE: 71 BPM | OXYGEN SATURATION: 99 % | RESPIRATION RATE: 18 BRPM | WEIGHT: 182.5 LBS | TEMPERATURE: 98.4 F | BODY MASS INDEX: 25.55 KG/M2

## 2025-05-07 DIAGNOSIS — D50.8 OTHER IRON DEFICIENCY ANEMIAS: ICD-10-CM

## 2025-05-07 DIAGNOSIS — E03.8 OTHER SPECIFIED HYPOTHYROIDISM: Primary | ICD-10-CM

## 2025-05-07 DIAGNOSIS — R53.83 OTHER FATIGUE: ICD-10-CM

## 2025-05-07 DIAGNOSIS — E53.8 B12 DEFICIENCY: ICD-10-CM

## 2025-05-07 DIAGNOSIS — R42 DIZZINESS: ICD-10-CM

## 2025-05-07 PROCEDURE — 99214 OFFICE O/P EST MOD 30 MIN: CPT | Performed by: FAMILY MEDICINE

## 2025-05-07 RX ORDER — LEVOTHYROXINE SODIUM 112 UG/1
112 TABLET ORAL
Qty: 90 TABLET | Refills: 1 | Status: SHIPPED | OUTPATIENT
Start: 2025-05-07

## 2025-05-07 NOTE — PROGRESS NOTES
Name: Neema Taylor      : 1975      MRN: [id number]  Encounter Provider: Patric Johnson MD  Encounter Date: 2025   [facility]  :  Assessment & Plan  Other specified hypothyroidism  Well-controlled  Continue levothyroxine  Monitor TSH  Orders:    levothyroxine 112 mcg tablet; Take 1 tablet (112 mcg total) by mouth daily in the early morning    TSH, 3rd generation; Future    B12 deficiency  Patient is a vegan  Recommend vitamin B12 supplement due to history of low B12 levels    Orders:    Vitamin B12; Future    Other iron deficiency anemias  Iron deficiency anemia  Likely due to inadequate dietary intake since patient is a vegan  Patient does endorse fatigue and dizziness  Will check iron levels with CBC  Recommend daily iron supplement      Orders:    CBC and differential; Future    Iron Panel (Includes Ferritin, Iron Sat%, Iron, and TIBC); Future    Other fatigue    Orders:    CBC and differential; Future    Iron Panel (Includes Ferritin, Iron Sat%, Iron, and TIBC); Future    Vitamin D 25 hydroxy; Future    Dizziness  Intermittent episodes  No vertigo or syncope  Will check iron and hemoglobin levels  Increase fluid hydration  Orders:    CBC and differential; Future    Iron Panel (Includes Ferritin, Iron Sat%, Iron, and TIBC); Future           History of Present Illness    ID [id number]    49-year-old female with a history of hypothyroidism who presents today for follow-up.  Patient reports that she has been feeling intermittent dizziness for the past couple of weeks.  She denies any syncope.  She denies any loss of consciousness.  She denies any vertigo.  She is taking a vegan multivitamin.  She takes her iron medication thyroid medication regularly        Review of Systems   Constitutional:  Positive for fatigue. Negative for appetite change, chills, diaphoresis, fever and unexpected weight change.   Respiratory:   "Negative for shortness of breath and wheezing.    Cardiovascular:  Negative for chest pain, palpitations and leg swelling.   Gastrointestinal:  Negative for abdominal pain, diarrhea, nausea and vomiting.   Endocrine: Negative for polydipsia, polyphagia and polyuria.   Genitourinary:  Negative for dysuria, hematuria and urgency.   Musculoskeletal:  Negative for arthralgias.   Skin:  Negative for rash.   Neurological:  Positive for dizziness. Negative for tremors, seizures, syncope, light-headedness, numbness and headaches.       Objective   /68 (BP Location: Right arm, Patient Position: Sitting, Cuff Size: Standard)   Pulse 71   Temp 98.4 °F (36.9 °C) (Temporal)   Resp 18   Ht 5' 11\" (1.803 m)   Wt 82.8 kg (182 lb 8 oz)   LMP 06/23/2024 (Exact Date)   SpO2 99%   BMI 25.45 kg/m²      Physical Exam  Constitutional:       General: She is not in acute distress.     Appearance: Normal appearance. She is well-developed. She is not ill-appearing, toxic-appearing or diaphoretic.   HENT:      Head: Normocephalic and atraumatic.      Right Ear: External ear normal.      Left Ear: External ear normal.      Nose: Nose normal.      Mouth/Throat:      Pharynx: No oropharyngeal exudate.   Eyes:      General: No scleral icterus.        Right eye: No discharge.         Left eye: No discharge.      Extraocular Movements: Extraocular movements intact.      Pupils: Pupils are equal, round, and reactive to light.   Cardiovascular:      Rate and Rhythm: Normal rate and regular rhythm.      Heart sounds: Normal heart sounds. No murmur heard.     No friction rub. No gallop.   Pulmonary:      Effort: Pulmonary effort is normal. No respiratory distress.      Breath sounds: Normal breath sounds. No stridor. No wheezing or rhonchi.   Abdominal:      General: Bowel sounds are normal. There is no distension.      Palpations: Abdomen is soft. There is no mass.      Tenderness: There is no abdominal tenderness. There is no guarding. "   Musculoskeletal:         General: No swelling, tenderness or deformity. Normal range of motion.      Cervical back: Normal range of motion.      Right lower leg: No edema.      Left lower leg: No edema.   Lymphadenopathy:      Cervical: No cervical adenopathy.   Skin:     General: Skin is warm.      Capillary Refill: Capillary refill takes less than 2 seconds.   Neurological:      General: No focal deficit present.      Mental Status: She is alert and oriented to person, place, and time.      Cranial Nerves: No cranial nerve deficit.      Motor: No weakness.      Gait: Gait normal.   Psychiatric:         Behavior: Behavior normal.

## 2025-05-07 NOTE — ASSESSMENT & PLAN NOTE
Iron deficiency anemia  Likely due to inadequate dietary intake since patient is a vegan  Patient does endorse fatigue and dizziness  Will check iron levels with CBC  Recommend daily iron supplement      Orders:    CBC and differential; Future    Iron Panel (Includes Ferritin, Iron Sat%, Iron, and TIBC); Future

## 2025-05-07 NOTE — ASSESSMENT & PLAN NOTE
Well-controlled  Continue levothyroxine  Monitor TSH  Orders:    levothyroxine 112 mcg tablet; Take 1 tablet (112 mcg total) by mouth daily in the early morning    TSH, 3rd generation; Future

## 2025-05-07 NOTE — ASSESSMENT & PLAN NOTE
Patient is a vegan  Recommend vitamin B12 supplement due to history of low B12 levels    Orders:    Vitamin B12; Future

## 2025-05-28 ENCOUNTER — APPOINTMENT (OUTPATIENT)
Dept: LAB | Facility: CLINIC | Age: 50
End: 2025-05-28
Payer: MEDICARE

## 2025-05-28 ENCOUNTER — HOSPITAL ENCOUNTER (OUTPATIENT)
Dept: MAMMOGRAPHY | Facility: CLINIC | Age: 50
Discharge: HOME | End: 2025-05-28
Payer: MEDICARE

## 2025-05-28 VITALS — BODY MASS INDEX: 25.48 KG/M2 | WEIGHT: 182 LBS | HEIGHT: 71 IN

## 2025-05-28 DIAGNOSIS — E03.8 OTHER SPECIFIED HYPOTHYROIDISM: ICD-10-CM

## 2025-05-28 DIAGNOSIS — R42 DIZZINESS: ICD-10-CM

## 2025-05-28 DIAGNOSIS — E53.8 B12 DEFICIENCY: ICD-10-CM

## 2025-05-28 DIAGNOSIS — Z12.31 ENCOUNTER FOR SCREENING MAMMOGRAM FOR BREAST CANCER: ICD-10-CM

## 2025-05-28 DIAGNOSIS — R53.83 OTHER FATIGUE: ICD-10-CM

## 2025-05-28 DIAGNOSIS — D50.8 OTHER IRON DEFICIENCY ANEMIAS: ICD-10-CM

## 2025-05-28 LAB
25(OH)D3 SERPL-MCNC: 24.7 NG/ML (ref 30–100)
BASOPHILS # BLD AUTO: 0.02 THOUSANDS/ÂΜL (ref 0–0.1)
BASOPHILS NFR BLD AUTO: 1 % (ref 0–1)
EOSINOPHIL # BLD AUTO: 0.15 THOUSAND/ÂΜL (ref 0–0.61)
EOSINOPHIL NFR BLD AUTO: 5 % (ref 0–6)
ERYTHROCYTE [DISTWIDTH] IN BLOOD BY AUTOMATED COUNT: 16.3 % (ref 11.6–15.1)
FERRITIN SERPL-MCNC: 6 NG/ML (ref 30–307)
HCT VFR BLD AUTO: 40.8 % (ref 34.8–46.1)
HGB BLD-MCNC: 12 G/DL (ref 11.5–15.4)
IMM GRANULOCYTES # BLD AUTO: 0.01 THOUSAND/UL (ref 0–0.2)
IMM GRANULOCYTES NFR BLD AUTO: 0 % (ref 0–2)
IRON SATN MFR SERPL: 33 % (ref 15–50)
IRON SERPL-MCNC: 109 UG/DL (ref 50–212)
LYMPHOCYTES # BLD AUTO: 1.53 THOUSANDS/ÂΜL (ref 0.6–4.47)
LYMPHOCYTES NFR BLD AUTO: 50 % (ref 14–44)
MCH RBC QN AUTO: 22.1 PG (ref 26.8–34.3)
MCHC RBC AUTO-ENTMCNC: 29.4 G/DL (ref 31.4–37.4)
MCV RBC AUTO: 75 FL (ref 82–98)
MONOCYTES # BLD AUTO: 0.39 THOUSAND/ÂΜL (ref 0.17–1.22)
MONOCYTES NFR BLD AUTO: 13 % (ref 4–12)
NEUTROPHILS # BLD AUTO: 0.95 THOUSANDS/ÂΜL (ref 1.85–7.62)
NEUTS SEG NFR BLD AUTO: 31 % (ref 43–75)
NRBC BLD AUTO-RTO: 0 /100 WBCS
PLATELET # BLD AUTO: 226 THOUSANDS/UL (ref 149–390)
PMV BLD AUTO: 11.5 FL (ref 8.9–12.7)
RBC # BLD AUTO: 5.43 MILLION/UL (ref 3.81–5.12)
TIBC SERPL-MCNC: 330.4 UG/DL (ref 250–450)
TRANSFERRIN SERPL-MCNC: 236 MG/DL (ref 203–362)
TSH SERPL DL<=0.05 MIU/L-ACNC: 5.43 UIU/ML (ref 0.45–4.5)
UIBC SERPL-MCNC: 221 UG/DL (ref 155–355)
VIT B12 SERPL-MCNC: 469 PG/ML (ref 180–914)
WBC # BLD AUTO: 3.05 THOUSAND/UL (ref 4.31–10.16)

## 2025-05-28 PROCEDURE — 77067 SCR MAMMO BI INCL CAD: CPT

## 2025-05-28 PROCEDURE — 83550 IRON BINDING TEST: CPT

## 2025-05-28 PROCEDURE — 84443 ASSAY THYROID STIM HORMONE: CPT

## 2025-05-28 PROCEDURE — 77063 BREAST TOMOSYNTHESIS BI: CPT

## 2025-05-28 PROCEDURE — 82306 VITAMIN D 25 HYDROXY: CPT

## 2025-05-28 PROCEDURE — 82728 ASSAY OF FERRITIN: CPT

## 2025-05-28 PROCEDURE — 85025 COMPLETE CBC W/AUTO DIFF WBC: CPT

## 2025-05-28 PROCEDURE — 82607 VITAMIN B-12: CPT

## 2025-05-28 PROCEDURE — 36415 COLL VENOUS BLD VENIPUNCTURE: CPT

## 2025-05-28 PROCEDURE — 83540 ASSAY OF IRON: CPT

## 2025-05-29 DIAGNOSIS — Z00.6 ENCOUNTER FOR EXAMINATION FOR NORMAL COMPARISON OR CONTROL IN CLINICAL RESEARCH PROGRAM: ICD-10-CM

## 2025-07-30 ENCOUNTER — APPOINTMENT (OUTPATIENT)
Dept: LAB | Facility: CLINIC | Age: 50
End: 2025-07-30

## 2025-07-30 DIAGNOSIS — Z00.6 ENCOUNTER FOR EXAMINATION FOR NORMAL COMPARISON OR CONTROL IN CLINICAL RESEARCH PROGRAM: ICD-10-CM

## 2025-07-30 PROCEDURE — 36415 COLL VENOUS BLD VENIPUNCTURE: CPT

## 2025-08-11 LAB
APOB+LDLR+PCSK9 GENE MUT ANL BLD/T: NOT DETECTED
BRCA1+BRCA2 DEL+DUP + FULL MUT ANL BLD/T: NOT DETECTED
MLH1+MSH2+MSH6+PMS2 GN DEL+DUP+FUL M: NOT DETECTED

## (undated) DEVICE — SUT MONOCRYL 4-0 PS-2 27 IN Y426H

## (undated) DEVICE — SUT VICRYL 0 CT-1 36 IN J946H

## (undated) DEVICE — GLOVE INDICATOR PI UNDERGLOVE SZ 7 BLUE

## (undated) DEVICE — DRAPE EQUIPMENT RF WAND

## (undated) DEVICE — INTENDED FOR TISSUE SEPARATION, AND OTHER PROCEDURES THAT REQUIRE A SHARP SURGICAL BLADE TO PUNCTURE OR CUT.: Brand: BARD-PARKER SAFETY BLADES SIZE 11, STERILE

## (undated) DEVICE — 3M™ STERI-STRIP™ REINFORCED ADHESIVE SKIN CLOSURES, R1547, 1/2 IN X 4 IN (12 MM X 100 MM), 6 STRIPS/ENVELOPE: Brand: 3M™ STERI-STRIP™

## (undated) DEVICE — UTERINE MANIPULATOR RUMI 5.1 X 6 CM

## (undated) DEVICE — POOLE SUCTION HANDLE: Brand: CARDINAL HEALTH

## (undated) DEVICE — ENSEAL X1 TISSUE SEALER, CURVED JAW, 37 CM SHAFT LENGTH: Brand: ENSEAL

## (undated) DEVICE — IV EXTENSION TUBING 33 IN

## (undated) DEVICE — DRAPE SHEET THREE QUARTER

## (undated) DEVICE — SCD SEQUENTIAL COMPRESSION COMFORT SLEEVE MEDIUM KNEE LENGTH: Brand: KENDALL SCD

## (undated) DEVICE — GLOVE SRG BIOGEL 7.5

## (undated) DEVICE — LAPAROSCOPIC SMOKE EVAC TUBING

## (undated) DEVICE — PENCIL ELECTROSURG E-Z CLEAN -0035H

## (undated) DEVICE — DECANTER: Brand: UNBRANDED

## (undated) DEVICE — TROCAR: Brand: KII SLEEVE

## (undated) DEVICE — TUBING SUCTION 5MM X 12 FT

## (undated) DEVICE — PREMIUM DRY TRAY LF: Brand: MEDLINE INDUSTRIES, INC.

## (undated) DEVICE — TROCAR: Brand: KII FIOS FIRST ENTRY

## (undated) DEVICE — UNDYED BRAIDED (POLYGLACTIN 910), SYNTHETIC ABSORBABLE SUTURE: Brand: COATED VICRYL

## (undated) DEVICE — [HIGH FLOW INSUFFLATOR,  DO NOT USE IF PACKAGE IS DAMAGED,  KEEP DRY,  KEEP AWAY FROM SUNLIGHT,  PROTECT FROM HEAT AND RADIOACTIVE SOURCES.]: Brand: PNEUMOSURE

## (undated) DEVICE — GLOVE INDICATOR PI UNDERGLOVE SZ 7.5 BLUE

## (undated) DEVICE — BETHLEHEM UNIVERSAL GYN LAP PK: Brand: CARDINAL HEALTH

## (undated) DEVICE — 40595 XL TRENDELENBURG POSITIONING KIT: Brand: 40595 XL TRENDELENBURG POSITIONING KIT

## (undated) DEVICE — MAYO STAND COVER: Brand: CONVERTORS

## (undated) DEVICE — BETHLEHEM UNIVERSAL MINOR VAG: Brand: CARDINAL HEALTH

## (undated) DEVICE — ELECTRODE LAP J HOOK E-Z CLEAN 33CM-0021

## (undated) DEVICE — 3000CC GUARDIAN II: Brand: GUARDIAN

## (undated) DEVICE — TRAY FOLEY 16FR URIMETER SILICONE SURESTEP

## (undated) DEVICE — OCCLUDER COLPO-PNEUMO

## (undated) DEVICE — SUT STRATAFIX SPIRAL 2-0 CT-1 30 CM SXPP1B410

## (undated) DEVICE — IRRIG ENDO FLO TUBING

## (undated) DEVICE — METZENBAUM ADTEC SINGLE USE DISSECTING SCISSORS, SHAFT ONLY, MONOPOLAR, CURVED TO LEFT, WORKING LENGTH: 12 1/4", (310 MM), DIAM. 5 MM, INSULATED, DOUBLE ACTION, STERILE, DISPOSABLE, PACKAGE OF 10 PIECES: Brand: AESCULAP

## (undated) DEVICE — PLASTIC ADHESIVE BANDAGE: Brand: CURITY

## (undated) DEVICE — GLOVE PI ULTRA TOUCH SZ.7.0

## (undated) DEVICE — CHLORAPREP HI-LITE 26ML ORANGE

## (undated) DEVICE — SYRINGE 50ML LL

## (undated) DEVICE — BLUE HEAT SCOPE WARMER